# Patient Record
Sex: FEMALE | Race: WHITE | NOT HISPANIC OR LATINO | Employment: OTHER | URBAN - METROPOLITAN AREA
[De-identification: names, ages, dates, MRNs, and addresses within clinical notes are randomized per-mention and may not be internally consistent; named-entity substitution may affect disease eponyms.]

---

## 2017-01-16 ENCOUNTER — OFFICE VISIT (OUTPATIENT)
Dept: MEDICAL GROUP | Facility: PHYSICIAN GROUP | Age: 52
End: 2017-01-16
Payer: COMMERCIAL

## 2017-01-16 VITALS
HEIGHT: 67 IN | SYSTOLIC BLOOD PRESSURE: 144 MMHG | WEIGHT: 206 LBS | BODY MASS INDEX: 32.33 KG/M2 | RESPIRATION RATE: 16 BRPM | HEART RATE: 84 BPM | OXYGEN SATURATION: 97 % | TEMPERATURE: 98.1 F | DIASTOLIC BLOOD PRESSURE: 90 MMHG

## 2017-01-16 DIAGNOSIS — R92.8 ABNORMAL MAMMOGRAM OF BOTH BREASTS: ICD-10-CM

## 2017-01-16 DIAGNOSIS — E66.9 OBESITY (BMI 30.0-34.9): ICD-10-CM

## 2017-01-16 PROCEDURE — 99214 OFFICE O/P EST MOD 30 MIN: CPT | Performed by: NURSE PRACTITIONER

## 2017-01-16 RX ORDER — PHENTERMINE HYDROCHLORIDE 37.5 MG/1
37.5 CAPSULE ORAL EVERY MORNING
Qty: 30 CAP | Refills: 0 | Status: SHIPPED | OUTPATIENT
Start: 2017-01-16 | End: 2018-05-25 | Stop reason: SDUPTHER

## 2017-01-16 NOTE — PROGRESS NOTES
"Chief Complaint   Patient presents with   • Results     Follow up       Subjective:   Rosita Chu is a 51 y.o. female here today for evaluation and management of:    Abnormal mammogram of both breasts  Patient had asymmetrical breast findings on mammo with followup ultrasound.  No calcifications or masses found.  Recommendation is for 6 month followup.  Order placed.     Obesity (BMI 30.0-34.9)  Patient stopped taking Phentermine during the holiday.  She has now started her walking program and is starting to take phentermine.  Denies palpitations, chest pain.  Weight is down 3 pounds since last visit.         Current medicines (including changes today)  Current Outpatient Prescriptions   Medication Sig Dispense Refill   • phentermine 37.5 MG capsule Take 1 Cap by mouth every morning. 30 Cap 0   • phentermine 37.5 MG capsule Take 1 Cap by mouth every morning. 30 Cap 0   • clobetasol (TEMOVATE) 0.05 % OINT APPLY TO AFFECTED AREA(S) 2 TIMES A DAY. AS DIRECTED 30 g 2     No current facility-administered medications for this visit.     She  has a past medical history of Anxiety; Depression; ASTHMA; and Allergic rhinitis (10/6/2014).    ROS + obesity. - chest pain, palpitations.  No chest pain, no shortness of breath, no abdominal pain       Objective:     Blood pressure 144/90, pulse 84, temperature 36.7 °C (98.1 °F), resp. rate 16, height 1.702 m (5' 7.01\"), weight 93.441 kg (206 lb), SpO2 97 %. Body mass index is 32.26 kg/(m^2). down 3 pounds  Physical Exam:  Constitutional: Alert, no distress.  Skin: Warm, dry, good turgor,no cyanosis, no rashes in visible areas.  Eye: Equal, round and reactive, conjunctiva clear, lids normal.  Ears: No tenderness, no discharge.  External canals are without any significant edema or erythema. .  Gross auditory acuity is intact.  Nose: symmetrical without tenderness, no discharge.  Mouth/Throat: lips without lesion.  Oropharynx clear.    Neck: Trachea midline, no masses, no " obvious thyroid enlargement.. . Range of motion within normal limits.  Neuro: Cranial nerves 2-12 grossly intact.  No sensory deficit.  Respiratory: Unlabored respiratory effort, lungs clear to auscultation, no wheezes, no ronchi.  Cardiovascular: Normal S1, S2, no murmur, no edema.  Abdomen: Obese, non-tender, no masses, no guarding,  no hepatosplenomegaly.  Psych: Alert and oriented x3, normal affect and mood and judgement.        Assessment and Plan:   The following treatment plan was discussed    1. Abnormal mammogram of both breasts  Chronic. Stable.  Will repeat diagnostic mammo/ultrasound in 6 months per radiology recommendation.  Order placed.  Will monitor and follow    2. Obesity (BMI 30.0-34.9)  Chronic. Improving.  Continue with phentermine and walking program.  Reviewed labs.  Diet and exercise around lipid panel.  Will monitor and follow up in 3 months. Patient is due for well woman exam.  She will schedule pap in March, 2017.  - phentermine 37.5 MG capsule; Take 1 Cap by mouth every morning.  Dispense: 30 Cap; Refill: 0      Followup: Return in about 3 months (around 4/16/2017) for well women.

## 2017-01-16 NOTE — ASSESSMENT & PLAN NOTE
Patient stopped taking Phentermine during the holiday.  She has now started her walking program and is starting to take phentermine.  Denies palpitations, chest pain.  Weight is down 3 pounds since last visit.

## 2017-01-16 NOTE — ASSESSMENT & PLAN NOTE
Patient had asymmetrical breast findings on mammo with followup ultrasound.  No calcifications or masses found.  Recommendation is for 6 month followup.  Order placed.

## 2017-05-18 ENCOUNTER — OFFICE VISIT (OUTPATIENT)
Dept: MEDICAL GROUP | Facility: PHYSICIAN GROUP | Age: 52
End: 2017-05-18
Payer: COMMERCIAL

## 2017-05-18 VITALS
WEIGHT: 206 LBS | TEMPERATURE: 97.3 F | HEART RATE: 92 BPM | HEIGHT: 67 IN | RESPIRATION RATE: 14 BRPM | SYSTOLIC BLOOD PRESSURE: 142 MMHG | OXYGEN SATURATION: 94 % | DIASTOLIC BLOOD PRESSURE: 90 MMHG | BODY MASS INDEX: 32.33 KG/M2

## 2017-05-18 DIAGNOSIS — J34.89 FRONTAL SINUS PAIN: ICD-10-CM

## 2017-05-18 PROCEDURE — 99214 OFFICE O/P EST MOD 30 MIN: CPT | Performed by: NURSE PRACTITIONER

## 2017-05-18 RX ORDER — AZITHROMYCIN 250 MG/1
TABLET, FILM COATED ORAL
Qty: 6 TAB | Refills: 0 | Status: SHIPPED | OUTPATIENT
Start: 2017-05-18 | End: 2019-12-03

## 2017-05-18 NOTE — ASSESSMENT & PLAN NOTE
Patient states that she has been sick for the past 2 weeks. She has had fever chills nausea sinus pain congestion and teeth pain for this period of time. She states that she has taken Sudafed and some Tylenol with minimal relief.

## 2017-05-18 NOTE — PROGRESS NOTES
"Chief Complaint   Patient presents with   • Sinus Problem       Subjective:   Rosita Chu is a 52 y.o. female here today for evaluation and management of:    Frontal sinus pain  Patient states that she has been sick for the past 2 weeks. She has had fever chills nausea sinus pain congestion and teeth pain for this period of time. She states that she has taken Sudafed and some Tylenol with minimal relief.     Patient states on her chart that she is allergic to erythromycin as it causes hives but also states that she has had a Z-Nico in the past 3 or 4 years without issue. She is also allergic to penicillin including Augmentin.    Current medicines (including changes today)  Current Outpatient Prescriptions   Medication Sig Dispense Refill   • azithromycin (ZITHROMAX) 250 MG Tab Take two tablets today and then one table for the next four days 6 Tab 0   • phentermine 37.5 MG capsule Take 1 Cap by mouth every morning. 30 Cap 0   • phentermine 37.5 MG capsule Take 1 Cap by mouth every morning. 30 Cap 0   • clobetasol (TEMOVATE) 0.05 % OINT APPLY TO AFFECTED AREA(S) 2 TIMES A DAY. AS DIRECTED 30 g 2     No current facility-administered medications for this visit.     She  has a past medical history of Anxiety; Depression; ASTHMA; and Allergic rhinitis (10/6/2014).    ROS as stated in history of present illness.  No chest pain, no shortness of breath, no abdominal pain       Objective:     Blood pressure 142/90, pulse 92, temperature 36.3 °C (97.3 °F), resp. rate 14, height 1.702 m (5' 7\"), weight 93.441 kg (206 lb), SpO2 94 %. Body mass index is 32.26 kg/(m^2).   Physical Exam:  Constitutional: Alert, no distress.  Skin: Warm, dry, good turgor,no cyanosis, no rashes in visible areas.  Eye: Equal, round and reactive, conjunctiva clear, lids normal.  Ears: No tenderness, no discharge.  External canals are without any significant edema or erythema.  Tympanic membranes are without any inflammation, no effusion.  Gross " auditory acuity is intact.  Nose: symmetrical without tenderness, yellow discharge with red nasal passages. Patient has facial pain and frontal maxillary sinuses along with upper and lower teeth pain.   Mouth/Throat: lips without lesion.  Oropharynx clear.  Throat with mild erythema, exudates . Tonsils are absent.  Neck: Trachea midline, no masses, no obvious thyroid enlargement.. No cervical or supraclavicular lymphadenopathy. Range of motion within normal limits.  Neuro: Cranial nerves 2-12 grossly intact.  No sensory deficit.  Respiratory: Unlabored respiratory effort, lungs clear to auscultation, no wheezes, no ronchi.  Cardiovascular: Normal S1, S2, no murmur, no edema.  Abdomen: Soft, non-tender, no masses, no guarding,  no hepatosplenomegaly.  Psych: Alert and oriented x3, normal affect and mood and judgement.        Assessment and Plan:   The following treatment plan was discussed    1. Frontal sinus pain  This is a new problem to me. Acute. Unstable. Z-Nico ordered. Discussed with patient what to do if she were to develop hives. She does have Benadryl at home. And we discussed urgent care or ER precautions. Continue with Tylenol, she may add Advil in between for pain and fever control, cold and cough medication increase fluids and rest. Patient to let me know how she is doing on Monday.  PLEASE NOTE: This dictation was created using voice recognition software. I have made every reasonable attempt to correct obvious errors, but I expect that there are errors of grammar and possibly content that I did not discover before finalizing the note.      Followup: Return if symptoms worsen or fail to improve.

## 2017-05-18 NOTE — MR AVS SNAPSHOT
"        Rosita Chu   2017 9:40 AM   Office Visit   MRN: [id number]    Department:  [facility]   Dept Phone:  [phone]    Description:  Female : 1965   Provider:  SRAVAN Beach           Reason for Visit     Sinus Problem           Allergies as of 2017     Allergen Noted Reactions    Ees [Erythromycin] 2009   Hives    Pcn [Penicillins] 2009         You were diagnosed with     Frontal sinus pain   [[id number]]         Vital Signs     Blood Pressure Pulse Temperature Respirations Height Weight    142/90 mmHg 92 36.3 °C (97.3 °F) 14 1.702 m (5' 7\") 93.441 kg (206 lb)    Body Mass Index Oxygen Saturation Smoking Status             32.26 kg/m2 94% Never Smoker          Basic Information     Date Of Birth Sex Race Ethnicity Preferred Language    1965 Female White Non- English      Your appointments     2017  9:00 AM   MA DX30 with [facility] MG 1   [facility] BREAST [facility] ([address])    [address] [place] [id number]   [phone]              Problem List              ICD-10-CM Priority Class Noted - Resolved    Anxiety F41.9   Unknown - Present    Depression F32.9   Unknown - Present    ASTHMA    Unknown - Present    Lichen sclerosus et atrophicus of the vulva N90.4   2012 - Present    Allergic rhinitis J30.9   10/6/2014 - Present    Obesity (BMI 30.0-34.9) E66.9   2016 - Present    Abnormal mammogram of both breasts R92.8   2016 - Present    Frontal sinus pain J34.89   2017 - Present      Health Maintenance        Date Due Completion Dates    IMM PNEUMOCOCCAL 19-64 (ADULT) MEDIUM RISK SERIES (1 of 1 - PPSV23) 3/22/1984 ---    PAP SMEAR 2015, 2010    COLONOSCOPY 3/22/2015 ---    MAMMOGRAM 2017, 2013 (Done)    Override on 2013: Done    IMM DTaP/Tdap/Td Vaccine (2 - Td) 10/6/2024 10/6/2014            Current Immunizations     Influenza Vaccine Pediatric " 10/17/2005    Influenza Vaccine Quad Inj (Pf) 10/6/2014    Tdap Vaccine 10/6/2014      Below and/or attached are the medications your provider expects you to take. Review all of your home medications and newly ordered medications with your provider and/or pharmacist. Follow medication instructions as directed by your provider and/or pharmacist. Please keep your medication list with you and share with your provider. Update the information when medications are discontinued, doses are changed, or new medications (including over-the-counter products) are added; and carry medication information at all times in the event of emergency situations     Allergies:  EES - Hives     PCN - (reactions not documented)               Medications  Valid as of: May 18, 2017 -  9:51 AM    Generic Name Brand Name Tablet Size Instructions for use    Azithromycin (Tab) ZITHROMAX 250 MG Take two tablets today and then one table for the next four days        Clobetasol Propionate (Ointment) TEMOVATE 0.05 % APPLY TO AFFECTED AREA(S) 2 TIMES A DAY. AS DIRECTED        Phentermine HCl (Cap) phentermine 37.5 MG Take 1 Cap by mouth every morning.        Phentermine HCl (Cap) phentermine 37.5 MG Take 1 Cap by mouth every morning.        .                 Medicines prescribed today were sent to:     [facility] [address] [place] [address] AT [facility] & [address] [address]    Phone: [phone] Fax: [phone]    Open 24 Hours?: No      Medication refill instructions:       If your prescription bottle indicates you have medication refills left, it is not necessary to call your provider’s office. Please contact your pharmacy and they will refill your medication.    If your prescription bottle indicates you do not have any refills left, you may request refills at any time through one of the following ways: The online BugBuster system (except Urgent Care), by calling your provider’s office, or by  asking your pharmacy to contact your provider’s office with a refill request. Medication refills are processed only during regular business hours and may not be available until the next business day. Your provider may request additional information or to have a follow-up visit with you prior to refilling your medication.   *Please Note: Medication refills are assigned a new Rx number when refilled electronically. Your pharmacy may indicate that no refills were authorized even though a new prescription for the same medication is available at the pharmacy. Please request the medicine by name with the pharmacy before contacting your provider for a refill.           Moz Access Code: [email or web address]  Expires: 6/17/2017  9:51 AM    Moz  A secure, online tool to manage your health information     [employer]’s Moz® is a secure, online tool that connects you to your personalized health information from the privacy of your home -- day or night - making it very easy for you to manage your healthcare. Once the activation process is completed, you can even access your medical information using the Moz diamante, which is available for free in the Apple Diamante store or Google Play store.     Moz provides the following levels of access (as shown below):   My Chart Features   [facility] Doctor [facility]  Specialists [facility]  Urgent  Care Non-[facility]  Primary Care  Doctor   Email your healthcare team securely and privately 24/7 X X X    Manage appointments: schedule your next appointment; view details of past/upcoming appointments X      Request prescription refills. X      View recent personal medical records, including lab and immunizations X X X X   View health record, including health history, allergies, medications X X X X   Read reports about your outpatient visits, procedures, consult and ER notes X X X X   See your discharge summary, which is a recap of your hospital and/or ER visit that includes your  diagnosis, lab results, and care plan. X X       How to register for Netcontinuum:  1. Go to  [email or web address].  2. Click on the Sign Up Now box, which takes you to the New Member Sign Up page. You will need to provide the following information:  a. Enter your Netcontinuum Access Code exactly as it appears at the top of this page. (You will not need to use this code after you’ve completed the sign-up process. If you do not sign up before the expiration date, you must request a new code.)   b. Enter your date of birth.   c. Enter your home email address.   d. Click Submit, and follow the next screen’s instructions.  3. Create a Back9 Networkt ID. This will be your Netcontinuum login ID and cannot be changed, so think of one that is secure and easy to remember.  4. Create a Back9 Networkt password. You can change your password at any time.  5. Enter your Password Reset Question and Answer. This can be used at a later time if you forget your password.   6. Enter your e-mail address. This allows you to receive e-mail notifications when new information is available in Netcontinuum.  7. Click Sign Up. You can now view your health information.    For assistance activating your Netcontinuum account, call [phone]

## 2017-05-22 ENCOUNTER — TELEPHONE (OUTPATIENT)
Dept: MEDICAL GROUP | Facility: PHYSICIAN GROUP | Age: 52
End: 2017-05-22

## 2017-05-22 NOTE — TELEPHONE ENCOUNTER
VOICEMAIL  1. Caller Name: Rosita                      Call Back Number: [phone] (home)       2. Message: Patient took her last Z-pack pill this morning and feels like she needs something stronger. She is a little bit better. Please advise     3. Patient approves office to leave a detailed voicemail/MyChart message: N\A

## 2017-05-23 NOTE — TELEPHONE ENCOUNTER
Please let patient know that the Z pack is only 5 days, but it last for 10 days.  We would want to wait and see if there is continued improvement.  If no improvement at the 10 day chuck, patient should be seen again.  Sometimes it takes a little time.  Let's see how you are feeling by Thursday.  PATRICK Beach.

## 2017-06-19 ENCOUNTER — TELEPHONE (OUTPATIENT)
Dept: MEDICAL GROUP | Facility: PHYSICIAN GROUP | Age: 52
End: 2017-06-19

## 2017-06-19 ENCOUNTER — HOSPITAL ENCOUNTER (OUTPATIENT)
Dept: RADIOLOGY | Facility: MEDICAL CENTER | Age: 52
End: 2017-06-19
Attending: NURSE PRACTITIONER
Payer: COMMERCIAL

## 2017-06-19 DIAGNOSIS — R92.8 ABNORMAL MAMMOGRAM OF BOTH BREASTS: ICD-10-CM

## 2017-06-19 PROCEDURE — G0204 DX MAMMO INCL CAD BI: HCPCS

## 2017-06-19 NOTE — TELEPHONE ENCOUNTER
----- Message from SRAVAN Beach sent at 6/19/2017 12:48 PM PDT -----  Please notify patient that her mammogram results have been read.  There was no evidence of malignancy or suspicious areas of concern.   There have been no changes from the previous mammogram.   Recommend yearly follow up screening.  SRAVAN Beach

## 2017-11-22 ENCOUNTER — HOSPITAL ENCOUNTER (OUTPATIENT)
Facility: MEDICAL CENTER | Age: 52
End: 2017-11-22
Attending: NURSE PRACTITIONER
Payer: COMMERCIAL

## 2017-11-22 ENCOUNTER — OFFICE VISIT (OUTPATIENT)
Dept: MEDICAL GROUP | Facility: PHYSICIAN GROUP | Age: 52
End: 2017-11-22
Payer: COMMERCIAL

## 2017-11-22 VITALS
WEIGHT: 222 LBS | BODY MASS INDEX: 34.84 KG/M2 | RESPIRATION RATE: 14 BRPM | HEART RATE: 77 BPM | TEMPERATURE: 97.9 F | HEIGHT: 67 IN | OXYGEN SATURATION: 95 % | DIASTOLIC BLOOD PRESSURE: 80 MMHG | SYSTOLIC BLOOD PRESSURE: 138 MMHG

## 2017-11-22 DIAGNOSIS — Z23 NEED FOR VACCINATION: ICD-10-CM

## 2017-11-22 DIAGNOSIS — L70.0 ACNE VULGARIS: ICD-10-CM

## 2017-11-22 DIAGNOSIS — Z01.419 WELL WOMAN EXAM WITH ROUTINE GYNECOLOGICAL EXAM: ICD-10-CM

## 2017-11-22 DIAGNOSIS — Z12.4 SCREENING FOR CERVICAL CANCER: ICD-10-CM

## 2017-11-22 DIAGNOSIS — Z12.11 SCREEN FOR COLON CANCER: ICD-10-CM

## 2017-11-22 DIAGNOSIS — J34.89 FRONTAL SINUS PAIN: ICD-10-CM

## 2017-11-22 PROBLEM — L70.9 ACNE: Status: ACTIVE | Noted: 2017-11-22

## 2017-11-22 PROCEDURE — 90686 IIV4 VACC NO PRSV 0.5 ML IM: CPT | Performed by: NURSE PRACTITIONER

## 2017-11-22 PROCEDURE — 90471 IMMUNIZATION ADMIN: CPT | Performed by: NURSE PRACTITIONER

## 2017-11-22 PROCEDURE — 88175 CYTOPATH C/V AUTO FLUID REDO: CPT

## 2017-11-22 PROCEDURE — 87624 HPV HI-RISK TYP POOLED RSLT: CPT

## 2017-11-22 PROCEDURE — 99396 PREV VISIT EST AGE 40-64: CPT | Mod: 25 | Performed by: NURSE PRACTITIONER

## 2017-11-22 RX ORDER — DOXYCYCLINE HYCLATE 100 MG
100 TABLET ORAL 2 TIMES DAILY
Qty: 20 TAB | Refills: 0 | Status: SHIPPED | OUTPATIENT
Start: 2017-11-22 | End: 2019-12-03

## 2017-11-22 ASSESSMENT — PATIENT HEALTH QUESTIONNAIRE - PHQ9: CLINICAL INTERPRETATION OF PHQ2 SCORE: 0

## 2017-11-22 NOTE — ASSESSMENT & PLAN NOTE
Recent outbreak of acne along chin. Stressful events with son, would like an antibiotic.  This has helped in the past.  Using OTC Benzyl peroxide to help dry things out.

## 2017-11-22 NOTE — PROGRESS NOTES
CC:  Pap/Well Woman Exam    History of present illness:  Rosita Chu is 52 y.o.white female presenting today for well woman exam with gynecological exam and Pap smear.   She reports her periods are absent, menopausal.   Exercise not currently exercising    Frontal sinus pain  Started to have sinus pressure, pain and fever 7-10 days ago.  Coughing and blowing up green mucus.     Well woman exam with routine gynecological exam  Here for pap.  Has history of lichen sclerosus of vulva.  No current flare up.  No discharge reported.  Breast exam without abnormal findings.     Acne  Recent outbreak of acne along chin. Stressful events with son, would like an antibiotic.  This has helped in the past.  Using OTC Benzyl peroxide to help dry things out.      Past Medical History:   Diagnosis Date   • Allergic rhinitis 10/6/2014   • Anxiety    • ASTHMA    • Depression        Past Surgical History:   Procedure Laterality Date   • LUMBAR LAMINECTOMY DISKECTOMY  2005    L4-5   • HYSTEROSCOPY THERMAL ABLATION     • TONSILLECTOMY      17 years old       Outpatient Encounter Prescriptions as of 11/22/2017   Medication Sig Dispense Refill   • doxycycline (VIBRAMYCIN) 100 MG Tab Take 1 Tab by mouth 2 times a day. 20 Tab 0   • azithromycin (ZITHROMAX) 250 MG Tab Take two tablets today and then one table for the next four days 6 Tab 0   • phentermine 37.5 MG capsule Take 1 Cap by mouth every morning. 30 Cap 0   • phentermine 37.5 MG capsule Take 1 Cap by mouth every morning. 30 Cap 0   • clobetasol (TEMOVATE) 0.05 % OINT APPLY TO AFFECTED AREA(S) 2 TIMES A DAY. AS DIRECTED 30 g 2     No facility-administered encounter medications on file as of 11/22/2017.        Patient Active Problem List    Diagnosis Date Noted   • Acne 11/22/2017   • Well woman exam with routine gynecological exam 11/22/2017   • Frontal sinus pain 05/18/2017   • Obesity (BMI 30.0-34.9) 11/18/2016   • Abnormal mammogram of both breasts 11/18/2016   • Allergic  "rhinitis 10/06/2014   • Lichen sclerosus et atrophicus of the vulva 01/22/2012   • Anxiety    • Depression    • ASTHMA        .  Social History     Social History   • Marital status:      Spouse name: N/A   • Number of children: N/A   • Years of education: N/A     Occupational History   • Not on file.     Social History Main Topics   • Smoking status: Never Smoker   • Smokeless tobacco: Never Used   • Alcohol use 1.2 oz/week     2 Glasses of wine per week      Comment: month   • Drug use: No   • Sexual activity: Not Currently     Partners: Male      Comment: , 3 children, Manicurist     Other Topics Concern   • Not on file     Social History Narrative   • No narrative on file       Family History   Problem Relation Age of Onset   • Hypertension Mother    • Stroke Father    • Alcohol/Drug Sister    • Alcohol/Drug Sister    • Alcohol/Drug Brother          ROS:  Denies Weight loss, fatigue, chest pain, SOB, bowel or bladder changes. No significant dysmenorrhea, concerning vaginal discharge or irritation, no dyspareunia or postcoital bleeding. Denies h/o migraine with aura. Denies musculoskeletal, neurological, or psychiatric problems.      /80   Pulse 77   Temp 36.6 °C (97.9 °F)   Resp 14   Ht 1.702 m (5' 7\")   Wt 100.7 kg (222 lb)   SpO2 95%   BMI 34.77 kg/m²     GEN:  Appears well and in no apparent distress   NECK:  Supple without adenopathy or thyromegaly  LUNGS:  Clear and equal. No wheeze, ronchi, or rales.  CV:  RRR, S1, S2. No murmur.  Pedal pulses 2+ bilaterally.  BREAST:  Symmetrical without masses. No nipple discharge.  ABD:  Soft, non-tender, non-distended, normal bowel sounds.  No hepatosplenomegaly.  :  Normal external female genitalia. No lesions identified  Vaginal canal clear.  Cervix appears normal. Specimen collected from transformation zone. Bimanual exam:  No CMT, normal size uterus without masses or tenderness; no adnexal masses or tenderness.      Assessment and " plan    1. Frontal sinus pain  This is a new problem to me.  Sinusitis viral vs bacterial.  We will try doxyclcyine 100 mg bid X10 Increase fluids, increase rest, DayQuil or NyQuil for congestion. Ibuprofen for discomfort. Patient to return to clinic in 5-7 days if not feeling improvement.    2. Need for vaccination  Patient requesting flu vaccine. No fever at this time. Consent obtained.  I have placed the below orders and discussed them with an approved delegating provider.  The MA is performing the below orders under the direction of Derrick Edwards MD.    - INFLUENZA VACCINE QUAD INJ >3Y(PF)    3. Screening for cervical cancer  Specimen obtained. Will monitor results.  - THINPREP PAP WITH HPV; Future    4. Acne vulgaris  This is a new problem to me. Acute. Ongoing issue with flare. Discussed that the doxycycline for her sinuses will help clear her face. Continue with good facial care including benzyl peroxide. Discussed stress management. Encouraged focusing on plaque-based diet.    5. Screen for colon cancer  Patient due for colon cancer screening. Referral placed to gastroenterology.  - REFERRAL TO GASTROENTEROLOGY    6. Well woman exam with routine gynecological exam  Patient here for well woman exam. No abnormal findings. We will await Pap results. Monitor and follow.      F/u pending results

## 2017-11-22 NOTE — ASSESSMENT & PLAN NOTE
Started to have sinus pressure, pain and fever 7-10 days ago.  Coughing and blowing up green mucus.

## 2017-11-22 NOTE — ASSESSMENT & PLAN NOTE
Here for pap.  Has history of lichen sclerosus of vulva.  No current flare up.  No discharge reported.  Breast exam without abnormal findings.

## 2017-11-28 LAB
CYTOLOGY REG CYTOL: NORMAL
HPV HR 12 DNA CVX QL NAA+PROBE: NEGATIVE
HPV16 DNA SPEC QL NAA+PROBE: NEGATIVE
HPV18 DNA SPEC QL NAA+PROBE: NEGATIVE
SPECIMEN SOURCE: NORMAL

## 2017-11-29 ENCOUNTER — TELEPHONE (OUTPATIENT)
Dept: MEDICAL GROUP | Facility: PHYSICIAN GROUP | Age: 52
End: 2017-11-29

## 2017-11-29 NOTE — TELEPHONE ENCOUNTER
----- Message from SRAVAN Beach sent at 11/29/2017  7:20 AM PST -----  Please notify patient that her PAP results are back and there is no evidence of infection or malignancy.  Recommend repeat testing in 2 years.  SRAVAN Beach

## 2017-11-29 NOTE — LETTER
"November 29, 2017         Melida Chu  [address] [facility] [address]        Dear Melida:      Below are the results from your recent visit:    Please notify patient that her PAP results are back and there is no evidence of infection or malignancy.  Recommend repeat testing in 2 years.   SRAVAN Beach     Resulted Orders   THINPREP PAP WITH HPV   Result Value Ref Range    Cytology Reg See Path Report       Comment:      A separate pathology report will follow after the Cytology  specimen has been received by the laboratory and the results  are signed out by a pathologist.  Please see \"Pathology GYN Specimen\" order for these results.      Source Cervical     HPV Genotype 16 Negative Negative    HPV Genotype 18 Negative Negative    HPV Other High Risk Genotypes Negative Negative      Comment:      This test detects the high-risk HPV types 16, 18, 31, 33,  35, 39, 45, 51, 52, 56, 58, 59, 66, and 68. It is intended  for use in women 21 years and older with ASC-US cervical  cytology results and in women 30 years and older with  positive high-risk HPV results. Sensitivity may be affected  by specimen collection methods, stage of infection, and the  presence of interfering substances. Results should be  interpreted in conjunction with other available laboratory  and clinical data.      Narrative    Clinical Information:->01 Routine Check-up   PATHOLOGY GYN SPECIMEN    Narrative    [facility]  DEPARTMENT OF PATHOLOGY  [address] [place] [address]  [address]  Phone [phone]          Fax [phone]  Horace Meza M.D.     Marleen Skaggs M.D.     Kim Mosquera M.D.  Marleen Short M.D.     Sean Oscar M.D.     Ren Draper M.D.  Patient:    MELIDA CHU        Specimen    [id number]  #:  Copies to:  CERVICOVAGINAL CYTOLOGY REPORT  INTERPRETATION:  NEGATIVE FOR INTRAEPITHELIAL LESION OR MALIGNANCY.  SPECIMEN ADEQUACY:  Satisfactory for Evaluation.  No " endocervical cells/transformation zone  component present.  Report electronically signed by:  HARIS DISLA  ------------------------------------------------------------------------  This liquid based ThinPrep Pap test is screened with the use of an  image guided system.  Cervicovaginal Papanicolaou (Pap) smears are  screening tests with a well-documented false-negative rate.  A single  negative test is significantly less accurate than multiple negative  tests in successive screening periods.  RELATED LABORATORY RESULTS  Ordered by: NICOLE Ord Date: 11/22/2017 Ord Time: 21:38  Test Name        Collected  Result          Abnor  Range     Units  Date                      mal  Specimen Source  11/22/201  Cervical  7  HPV Genotype 16  11/22/201  Negative               Negative  7  HPV Genotype 18  11/22/201  Negative               Negative  7  HPV Other High   11/22/201  Negative               Negative  Risk Genotypes   7  Report electronically signed by:  HARIS DISLA  Diagnosis performed at: [facility]  Pathology  Department, [address] [place] [address]  [id number]  Printed 00/00/0000 [id number] MELIDA DUNCAN  Page 1 of 1 MRN#:[id number]     If you have any questions or concerns, please don't hesitate to call.    Electronically Signed

## 2018-04-16 DIAGNOSIS — E66.9 OBESITY (BMI 30.0-34.9): ICD-10-CM

## 2018-04-16 RX ORDER — PHENTERMINE HYDROCHLORIDE 37.5 MG/1
37.5 CAPSULE ORAL EVERY MORNING
Qty: 30 CAP | Refills: 0 | Status: SHIPPED
Start: 2018-04-16 | End: 2018-05-16

## 2018-04-16 NOTE — TELEPHONE ENCOUNTER
Requested Prescriptions     Signed Prescriptions Disp Refills   • phentermine 37.5 MG capsule 30 Cap 0     Sig: Take 1 Cap by mouth every morning for 30 days.     Authorizing Provider: SAMRA REHMAN     Needs appointment for further refills  PATRICK Beach.

## 2018-04-16 NOTE — TELEPHONE ENCOUNTER
RX FAXED TO     [id number] - [place], [place] - [address] AT [facility] [address]  [address] [place] [address]  Phone: [phone] Fax: [phone]

## 2018-05-25 DIAGNOSIS — E66.9 OBESITY (BMI 30.0-34.9): ICD-10-CM

## 2018-05-25 RX ORDER — PHENTERMINE HYDROCHLORIDE 37.5 MG/1
37.5 CAPSULE ORAL EVERY MORNING
Qty: 30 CAP | Refills: 0 | Status: SHIPPED
Start: 2018-05-25 | End: 2018-06-27 | Stop reason: SDUPTHER

## 2018-05-25 NOTE — TELEPHONE ENCOUNTER
Requested Prescriptions     Signed Prescriptions Disp Refills   • phentermine 37.5 MG capsule 30 Cap 0     Sig: Take 1 Cap by mouth every morning for 30 days.     Authorizing Provider: SAMRA REHMAN A.P.R.N.

## 2018-05-25 NOTE — TELEPHONE ENCOUNTER
Was the patient seen in the last year in this department? Yes     Does patient have an active prescription for medications requested? No     Received Request Via: Patient       MELIDA DUNCAN     Age: 53  demographics   Data as of: 5/25/2018              NARCOTIC 000        SEDATIVE 000       STIMULANT 130       NARxSCORES can range from 000 to 999. The first two digits represent the composite percentile risk based on an overall analysis of prescription drug use. The third digit represents the number of active prescriptions. The distribution of scores in the population is such that approximately 75% fall below 200, 95% fall below 500 and 99% fall below 650. The information on this report is not warranted as accurate or complete. This report is based on the search criteria supplied and the data entered by the dispensing pharmacy. For more information about any prescription, please contact the dispensing pharmacy or the prescriber. NARxSCORES and Reports are intended to aid, not replace medical decision making. None of the information presented should be used as sole justification for providing or refusing to provide medications.       Rx Graph   [x] Narcotic [x] Sedative [x] Stimulant [x] Buprenorphine [x] Other    Created with Raphaël [id number] - Latia Ivory  *Per CDC guidance, the MME conversion factors prescribed or provided as part of the medication-assisted treatment for opioid use disorder should not be used to benchmark against dosage thresholds meant for opioids prescribed for pain. Buprenorphine products have no agreed upon morphine equivalency, and as partial opioid agonists, are not expected to be associated with overdose risk in the same dose-dependent manner as doses for full agonist opioids. MME = morphine milligram equivalents. LME = Lorazepam milligram equivalents. mg = dose in milligrams.     Data Analysis        Stimulants (130)  2 months  6 months  1 year  2  years    Prescribers (stimulant) 1  19  1  12  1  8  1  6    Pharmacies (stimulant) 1  25  1  16  1  13  1  10    Stimulant days 30  34  30  16  30  14  90  33    Stimulant overlap (1) 0  0  0  0  0  0  0  0       (1) Number of days for which a simliar type of medication was prescribed from different prescribers for use on the same day.         Summary   Total Prescriptions: 3   Total Prescribers: 1   Total Pharmacies: 1   Narcotics* (excluding buprenorphine):   Current Qty: 0   Current MME/day: 0.00   30 Day Avg MME/day: 0.00   Buprenorphine*   Current Qty: 0   Current mg/day: 0.00   30 Day Avg mg/day: 0.00   Sedatives*   Current Qty: 0   Current LME/day: 0.00   30 Day Avg LME/day: 0.00     Prescriptions Total Prescriptions: 3 Private Pay: 3   Fill Date PT Written Drug Qty Days Rx # Prescriber Pharmacy Refill Daily Dose * Pymt Type    04/18/2018 1  04/18/2018 PHENTERMINE 37.5 MG CAPSULE 30 30 [id number] [facility] 0  Private Pay [place]   01/27/2017 1  01/16/2017 PHENTERMINE 37.5 MG CAPSULE 30 30 [id number] [facility] 0  Private Pay [place]   11/28/2016 1  11/18/2016 PHENTERMINE 37.5 MG CAPSULE 30 30 [id number] [facility] 0  Private Pay [place]     Providers Total Providers: 1   Name Address [facility] SAMRA CAAL [address] [facility] [place] [address] [id number]     Pharmacies Total Pharmacies: 1   Name Address [facility] [place] [address] [facility] [address] [id number]

## 2018-05-26 NOTE — TELEPHONE ENCOUNTER
RX FAXED TO     [id number] - [place], [place] - [address] [address]  Phone: [phone] Fax: [phone]

## 2018-06-27 DIAGNOSIS — E66.9 OBESITY (BMI 30.0-34.9): ICD-10-CM

## 2018-06-27 RX ORDER — PHENTERMINE HYDROCHLORIDE 37.5 MG/1
37.5 CAPSULE ORAL EVERY MORNING
Qty: 30 CAP | Refills: 0 | Status: SHIPPED
Start: 2018-06-27 | End: 2018-07-27

## 2018-06-28 NOTE — TELEPHONE ENCOUNTER
rx faxed to     [id number] - [place], [place] - [address] [address]  Phone: [phone] Fax: [phone]

## 2019-01-01 ENCOUNTER — OFFICE VISIT (OUTPATIENT)
Dept: URGENT CARE | Facility: PHYSICIAN GROUP | Age: 54
End: 2019-01-01
Payer: COMMERCIAL

## 2019-01-01 VITALS
BODY MASS INDEX: 33.27 KG/M2 | OXYGEN SATURATION: 94 % | WEIGHT: 212 LBS | TEMPERATURE: 97.9 F | RESPIRATION RATE: 18 BRPM | DIASTOLIC BLOOD PRESSURE: 82 MMHG | SYSTOLIC BLOOD PRESSURE: 130 MMHG | HEIGHT: 67 IN | HEART RATE: 89 BPM

## 2019-01-01 DIAGNOSIS — J01.00 ACUTE MAXILLARY SINUSITIS, RECURRENCE NOT SPECIFIED: ICD-10-CM

## 2019-01-01 PROCEDURE — 99214 OFFICE O/P EST MOD 30 MIN: CPT | Performed by: FAMILY MEDICINE

## 2019-01-01 RX ORDER — GUAIFENESIN 600 MG/1
600 TABLET, EXTENDED RELEASE ORAL EVERY 12 HOURS
COMMUNITY
End: 2019-12-03

## 2019-01-01 RX ORDER — BENZONATATE 200 MG/1
200 CAPSULE ORAL 3 TIMES DAILY PRN
Qty: 45 CAP | Refills: 0 | Status: SHIPPED | OUTPATIENT
Start: 2019-01-01 | End: 2019-12-03

## 2019-01-01 RX ORDER — DOXYCYCLINE HYCLATE 100 MG
100 TABLET ORAL 2 TIMES DAILY
Qty: 14 TAB | Refills: 0 | Status: SHIPPED | OUTPATIENT
Start: 2019-01-01 | End: 2019-01-08

## 2019-01-05 NOTE — PROGRESS NOTES
"  Chief Complaint   Patient presents with   • Sinus Pain     [id number]        Cough  This is a new problem. The current episode started 14 days ago. The problem has been gradually worsening. The problem occurs constantly. The cough is productive of yellow sputum. Associated symptoms include : headaches, fatigue, nasal congestion.   No objective fevers at home.    States cough is making it hard to sleep at night.   Pertinent negatives include no   nausea, vomiting, diarrhea, sweats, weight loss or wheezing. Nothing aggravates the symptoms.  Patient has tried several OTC cold products for the symptoms - no improvement. There is no history of asthma.        Past Medical History:   Diagnosis Date   • Allergic rhinitis 10/6/2014   • Anxiety    • ASTHMA    • Depression          Social History   Substance Use Topics   • Smoking status: Never Smoker   • Smokeless tobacco: Never Used   • Alcohol use 1.2 oz/week     2 Glasses of wine per week      Comment: month             Review of Systems   Constitutional: Negative for fever, chills and weight loss.   HENT - denies  ear pain.   Positive congestion, sore throat  Eyes: denies vision changes, discharge  Respiratory: Negative for hemoptysis and wheezing.    Cardiovascular: Negative for chest pain or PND.   Gastrointestinal:  No abdominal pain,  nausea, vomiting, diarrhea.  Negative for  blood in stool.    - no discharge, dysuria, frequency.      Neurological: Negative for dizziness.   + headaches.   musculoskeletal - denies myalgias, calf pain  Psych - denies anxiety/depression/mood changes.  Skin: no itching or rash  All other systems reviewed and are negative.             Objective:     Blood pressure 130/82, pulse 89, temperature 36.6 °C (97.9 °F), temperature source Temporal, resp. rate 18, height 1.702 m (5' 7\"), weight 96.2 kg (212 lb), SpO2 94 %.    Physical Exam   Constitutional: patient is oriented to person, place, and time. Patient appears well-developed and " well-nourished. No distress.   HENT:   Head: Normocephalic and atraumatic.   Right Ear: External ear normal.   Left Ear: External ear normal.   TMs both normal  Nose: Mucosal edema  present.   There is tenderness to percussion over left and right sinuses  Mouth/Throat: Mucous membranes are normal. No oral lesions.  No posterior pharyngeal erythema.  No oropharyngeal exudate or posterior oropharyngeal edema.   Eyes: Conjunctivae and EOM are normal. Pupils are equal, round, and reactive to light. Right eye exhibits no discharge. Left eye exhibits no discharge. No scleral icterus.   Neck: Normal range of motion. Neck supple. No tracheal deviation present.   Cardiovascular: Normal rate, regular rhythm and normal heart sounds.  Exam reveals no friction rub.    Pulmonary/Chest: Effort normal. No respiratory distress. Patient has no wheezes or rhonchi. Patient has no rales.    Musculoskeletal:  exhibits no edema.   Lymphadenopathy:     Patient has  cervical adenopathy.      Neurological: patient is alert and oriented to person, place, and time.   CN 2-12 intact  Skin: Skin is warm and dry. No rash noted. No erythema.   Psychiatric: patient  has a normal mood and affect.  behavior is normal.   Nursing note and vitals reviewed.              Assessment/Plan:           1. Acute maxillary sinusitis, recurrence not specified     - benzonatate (TESSALON) 200 MG capsule; Take 1 Cap by mouth 3 times a day as needed for Cough.  Dispense: 45 Cap; Refill: 0  - doxycycline (VIBRAMYCIN) 100 MG Tab; Take 1 Tab by mouth 2 times a day for 7 days.  Dispense: 14 Tab; Refill: 0        Follow up in one week if no improvement, sooner if symptoms worsen.

## 2019-12-03 ENCOUNTER — OFFICE VISIT (OUTPATIENT)
Dept: MEDICAL GROUP | Facility: PHYSICIAN GROUP | Age: 54
End: 2019-12-03
Payer: COMMERCIAL

## 2019-12-03 VITALS
WEIGHT: 223 LBS | SYSTOLIC BLOOD PRESSURE: 162 MMHG | HEART RATE: 73 BPM | DIASTOLIC BLOOD PRESSURE: 100 MMHG | BODY MASS INDEX: 35 KG/M2 | TEMPERATURE: 98 F | RESPIRATION RATE: 14 BRPM | HEIGHT: 67 IN | OXYGEN SATURATION: 96 %

## 2019-12-03 DIAGNOSIS — F32.89 OTHER DEPRESSION: ICD-10-CM

## 2019-12-03 DIAGNOSIS — Z00.00 PREVENTATIVE HEALTH CARE: ICD-10-CM

## 2019-12-03 DIAGNOSIS — R92.8 ABNORMAL MAMMOGRAM OF BOTH BREASTS: ICD-10-CM

## 2019-12-03 DIAGNOSIS — Z23 NEED FOR VACCINATION: ICD-10-CM

## 2019-12-03 DIAGNOSIS — N90.4 LICHEN SCLEROSUS ET ATROPHICUS OF THE VULVA: ICD-10-CM

## 2019-12-03 DIAGNOSIS — Z12.39 SCREENING FOR BREAST CANCER: ICD-10-CM

## 2019-12-03 PROCEDURE — 90471 IMMUNIZATION ADMIN: CPT | Performed by: NURSE PRACTITIONER

## 2019-12-03 PROCEDURE — 99396 PREV VISIT EST AGE 40-64: CPT | Mod: 25 | Performed by: NURSE PRACTITIONER

## 2019-12-03 PROCEDURE — 90686 IIV4 VACC NO PRSV 0.5 ML IM: CPT | Performed by: NURSE PRACTITIONER

## 2019-12-03 RX ORDER — CLOBETASOL PROPIONATE 0.5 MG/G
1 OINTMENT TOPICAL 2 TIMES DAILY
Qty: 60 G | Refills: 6 | Status: SHIPPED | OUTPATIENT
Start: 2019-12-03 | End: 2019-12-04 | Stop reason: SDUPTHER

## 2019-12-03 NOTE — ASSESSMENT & PLAN NOTE
Due for yearly mammogram.  Last US in 2017 was stable.  No reported breast changes.  Order needed.

## 2019-12-03 NOTE — PROGRESS NOTES
"Chief Complaint   Patient presents with   • Annual Exam   • Medication Refill       Subjective:   Rosita Chu is a 54 y.o. female here today for annual preventative exam    Lichen sclerosus et atrophicus of the vulva  Reports recent flare ups on  Vulva/anus.  clobatesol is helping this issue.  Needs refill.     Abnormal mammogram of both breasts  Due for yearly mammogram.  Last US in 2017 was stable.  No reported breast changes.  Order needed.     Depression  Patient reports she is doing well at this time.  Holidays are hard since the death of her  (suicide).  She does have grandbabies near and this is helping.  She is also volunteering and this helps.  No meds at this time.      Requesting influenza vaccine today.  Due for mammogram.     Current medicines (including changes today)  Current Outpatient Medications   Medication Sig Dispense Refill   • clobetasol (TEMOVATE) 0.05 % Ointment Apply 1 Application to affected area(s) 2 times a day. 60 g 6   • clobetasol (TEMOVATE) 0.05 % OINT APPLY TO AFFECTED AREA(S) 2 TIMES A DAY. AS DIRECTED 30 g 2     No current facility-administered medications for this visit.      She  has a past medical history of Allergic rhinitis (10/6/2014), Anxiety, ASTHMA, and Depression.    ROS as stated in hpi  No chest pain, no shortness of breath, no abdominal pain       Objective:     BP (!) 162/100 (BP Location: Left arm, Patient Position: Sitting)   Pulse 73   Temp 36.7 °C (98 °F) (Temporal)   Resp 14   Ht 1.702 m (5' 7\")   Wt 101.2 kg (223 lb)   SpO2 96%  Body mass index is 34.93 kg/m². blood pressure slightly elevated.  Blood press ure on  Retake 132/84  Physical Exam:  Constitutional: Alert, no distress.  Skin: Warm, dry, good turgor,no cyanosis, no rashes in visible areas.  Eye: Equal, round and reactive, conjunctiva clear, lids normal.  Ears: No tenderness, no discharge.  External canals are without any significant edema or erythema.  Tympanic membranes are " without any inflammation, no effusion.  Gross auditory acuity is intact.  Nose: symmetrical without tenderness, no discharge.  Mouth/Throat: lips without lesion.  Oropharynx clear.  Throat without erythema, exudates or tonsillar enlargement.  Neck: Trachea midline, no masses, no obvious thyroid enlargement.. No cervical or supraclavicular lymphadenopathy. Range of motion within normal limits.  Neuro: Cranial nerves 2-12 grossly intact.  No sensory deficit.  Respiratory: Unlabored respiratory effort, lungs clear to auscultation, no wheezes, no ronchi.  Cardiovascular: Normal S1, S2, no murmur, no edema.  Abdomen: Soft, non-tender, no masses, no guarding,  no hepatosplenomegaly.  Psych: Alert and oriented x3, normal affect and mood and judgement.        Assessment and Plan:   The following treatment plan was discussed    1. Lichen sclerosus et atrophicus of the vulva  Chronic, ongoing. Recent flare up.  Needs refills.  Refill sent. Monitor and follow.   - CBC WITH DIFFERENTIAL; Future  - Comp Metabolic Panel; Future    2. Need for vaccination  Due for vaccine.  No acute illness  I have placed the below orders and discussed them with an approved delegating provider.  The MA is performing the below orders under the direction of Dr. Reed MD.    - Influenza Vaccine Quad Injection (PF)    3. Preventative health care  Due for labs.  Orders provided.   - Lipid Profile; Future    4. Screening for breast cancer  Due for mammogram.  Order provided.   - MA-SCREEN MAMMO W/CAD-BILAT; Future    5. Abnormal mammogram of both breasts  See #4    6. Other depression  Chronic, ongoing. Stable at this time.  No suicidal thoughts/plan.  No meds.  Continue good volunteer work/exercise and family gatherings.       Followup: Return in about 1 year (around 12/3/2020).         Educated in proper administration of medication(s) ordered today including safety, possible SE, risks, benefits, rationale and alternatives to therapy.     Please  note that this dictation was created using voice recognition software. I have made every reasonable attempt to correct obvious errors, but I expect that there are errors of grammar and possibly content that I did not discover before finalizing the note.

## 2019-12-03 NOTE — ASSESSMENT & PLAN NOTE
Patient reports she is doing well at this time.  Holidays are hard since the death of her  (suicide).  She does have grandbabies near and this is helping.  She is also volunteering and this helps.  No meds at this time.

## 2019-12-04 RX ORDER — CLOBETASOL PROPIONATE 0.5 MG/G
1 OINTMENT TOPICAL 2 TIMES DAILY
Qty: 60 G | Refills: 6 | Status: SHIPPED | OUTPATIENT
Start: 2019-12-04 | End: 2021-03-30 | Stop reason: SDUPTHER

## 2019-12-04 NOTE — TELEPHONE ENCOUNTER
Was the patient seen in the last year in this department? Yes    Does patient have an active prescription for medications requested? Yes [facility]     Received Request Via: Patient

## 2019-12-05 NOTE — TELEPHONE ENCOUNTER
Requested Prescriptions     Signed Prescriptions Disp Refills   • clobetasol (TEMOVATE) 0.05 % Ointment 60 g 6     Sig: Apply 1 Application to affected area(s) 2 times a day.     Authorizing Provider: SAMRA REHMAN A.P.R.N.

## 2020-01-06 ENCOUNTER — HOSPITAL ENCOUNTER (OUTPATIENT)
Dept: RADIOLOGY | Facility: MEDICAL CENTER | Age: 55
End: 2020-01-06
Attending: NURSE PRACTITIONER
Payer: COMMERCIAL

## 2020-01-06 DIAGNOSIS — Z12.39 SCREENING FOR BREAST CANCER: ICD-10-CM

## 2020-01-06 PROCEDURE — 77067 SCR MAMMO BI INCL CAD: CPT

## 2020-01-07 ENCOUNTER — TELEPHONE (OUTPATIENT)
Dept: MEDICAL GROUP | Facility: PHYSICIAN GROUP | Age: 55
End: 2020-01-07

## 2020-01-07 NOTE — LETTER
January 7, 2020         Rosita Chu  [address] [facility] [address]        Dear Rosita:      Below are the results from your recent visit:    Please notify patient that her mammogram results have been read.  There was no evidence of malignancy or suspicious areas of concern.   Recommend yearly follow up screening.   SRAVAN Beach     Resulted Orders   MA-SCREEN MAMMO W/CAD-BILAT    Narrative    1/6/2020 9:11 AM    HISTORY/REASON FOR EXAM:  Routine Mammographic Screening.      TECHNIQUE/EXAM DESCRIPTION:  Bilateral digital screening mammography was performed and interpreted with CAD.    COMPARISON:   06/19/2017    FINDINGS:    The breast parenchyma is heterogeneously dense which may obscure small masses. There is no dominant mass, suspicious calcification, or any secondary malignant sign.      Impression    1.  Breasts are heterogeneously dense, which may obscure small masses. No gross evidence of malignancy and no interval change.    2.  Screening mammogram in one year is recommended.      R1 - Category 1:  Negative     If you have any questions or concerns, please don't hesitate to call.    Electronically Signed

## 2020-01-07 NOTE — TELEPHONE ENCOUNTER
----- Message from SRAVAN Beach sent at 1/7/2020 12:44 PM PST -----  Please notify patient that her mammogram results have been read.  There was no evidence of malignancy or suspicious areas of concern.   Recommend yearly follow up screening.  SRAVAN Beach

## 2020-08-04 ENCOUNTER — TELEPHONE (OUTPATIENT)
Dept: MEDICAL GROUP | Facility: PHYSICIAN GROUP | Age: 55
End: 2020-08-04

## 2020-08-10 NOTE — TELEPHONE ENCOUNTER
FINAL PRIOR AUTHORIZATION STATUS:    1.  Name of Medication & Dose: clobetasol (TEMOVATE) 0.05 % Ointment     2. Prior Auth Status: Approved through 08/10/2021     3. Action Taken: Pharmacy Notified: N\A Patient Notified: N\A

## 2020-12-07 ENCOUNTER — HOSPITAL ENCOUNTER (OUTPATIENT)
Facility: MEDICAL CENTER | Age: 55
End: 2020-12-07
Attending: NURSE PRACTITIONER
Payer: COMMERCIAL

## 2020-12-07 ENCOUNTER — OFFICE VISIT (OUTPATIENT)
Dept: MEDICAL GROUP | Facility: PHYSICIAN GROUP | Age: 55
End: 2020-12-07
Payer: COMMERCIAL

## 2020-12-07 VITALS
SYSTOLIC BLOOD PRESSURE: 168 MMHG | BODY MASS INDEX: 35.16 KG/M2 | RESPIRATION RATE: 16 BRPM | TEMPERATURE: 96.9 F | HEIGHT: 67 IN | WEIGHT: 224 LBS | HEART RATE: 76 BPM | DIASTOLIC BLOOD PRESSURE: 100 MMHG | OXYGEN SATURATION: 97 %

## 2020-12-07 DIAGNOSIS — Z23 NEED FOR VACCINATION: ICD-10-CM

## 2020-12-07 DIAGNOSIS — Z01.419 WELL WOMAN EXAM WITH ROUTINE GYNECOLOGICAL EXAM: ICD-10-CM

## 2020-12-07 DIAGNOSIS — B35.1 TOENAIL FUNGUS: ICD-10-CM

## 2020-12-07 DIAGNOSIS — Z12.4 SCREENING FOR CERVICAL CANCER: ICD-10-CM

## 2020-12-07 DIAGNOSIS — L57.0 AK (ACTINIC KERATOSIS): ICD-10-CM

## 2020-12-07 DIAGNOSIS — F32.89 OTHER DEPRESSION: ICD-10-CM

## 2020-12-07 DIAGNOSIS — Z12.83 SCREENING FOR SKIN CANCER: ICD-10-CM

## 2020-12-07 PROBLEM — J34.89 FRONTAL SINUS PAIN: Status: RESOLVED | Noted: 2017-05-18 | Resolved: 2020-12-07

## 2020-12-07 PROCEDURE — 87624 HPV HI-RISK TYP POOLED RSLT: CPT

## 2020-12-07 PROCEDURE — 90686 IIV4 VACC NO PRSV 0.5 ML IM: CPT | Performed by: NURSE PRACTITIONER

## 2020-12-07 PROCEDURE — 99396 PREV VISIT EST AGE 40-64: CPT | Mod: 25 | Performed by: NURSE PRACTITIONER

## 2020-12-07 PROCEDURE — 90471 IMMUNIZATION ADMIN: CPT | Performed by: NURSE PRACTITIONER

## 2020-12-07 PROCEDURE — 99000 SPECIMEN HANDLING OFFICE-LAB: CPT | Performed by: NURSE PRACTITIONER

## 2020-12-07 PROCEDURE — 88175 CYTOPATH C/V AUTO FLUID REDO: CPT

## 2020-12-07 NOTE — PROGRESS NOTES
CC:  Pap/Well Woman Exam    History of present illness:  Rosita Chu is 55 y.o.white female presenting today for well woman exam with gynecological exam and Pap smear.   She reports her periods are menopausal.    Diet working on decreasing sugar. Exercise working on getting back to walking    No problem-specific Assessment & Plan notes found for this encounter.  Well woman exam with routine gynecological exam  Here for annual exam and PAP.  Reporting chlobatesol is helping with flare.     Toenail fungus  Left big toe with fungal infection and requesting medication for this.  Recent injury to that toe.     Depression  Reports increasing stress.  Would like to try some zoloft 50 mg.  Will recheck in one month.  No suicidal ideation.  Stress with family.     AK (actinic keratosis)  Reports recurring sore on chin.  Would like to return to her dermatologist.  Referral sent.       Past Medical History:   Diagnosis Date   • Allergic rhinitis 10/6/2014   • Anxiety    • ASTHMA    • Depression        Past Surgical History:   Procedure Laterality Date   • LUMBAR LAMINECTOMY DISKECTOMY  2005    L4-5   • HYSTEROSCOPY THERMAL ABLATION     • TONSILLECTOMY      17 years old       Outpatient Encounter Medications as of 12/7/2020   Medication Sig Dispense Refill   • clobetasol (TEMOVATE) 0.05 % Ointment Apply 1 Application to affected area(s) 2 times a day. 60 g 6   • [DISCONTINUED] clobetasol (TEMOVATE) 0.05 % OINT APPLY TO AFFECTED AREA(S) 2 TIMES A DAY. AS DIRECTED 30 g 2     No facility-administered encounter medications on file as of 12/7/2020.        Patient Active Problem List    Diagnosis Date Noted   • Acne 11/22/2017   • Well woman exam with routine gynecological exam 11/22/2017   • Obesity (BMI 30.0-34.9) 11/18/2016   • Abnormal mammogram of both breasts 11/18/2016   • Allergic rhinitis 10/06/2014   • Lichen sclerosus et atrophicus of the vulva 01/22/2012   • Anxiety    • Depression    • ASTHMA        .  Social  History     Socioeconomic History   • Marital status:      Spouse name: Not on file   • Number of children: Not on file   • Years of education: Not on file   • Highest education level: Not on file   Occupational History   • Not on file   Social Needs   • Financial resource strain: Not on file   • Food insecurity     Worry: Not on file     Inability: Not on file   • Transportation needs     Medical: Not on file     Non-medical: Not on file   Tobacco Use   • Smoking status: Never Smoker   • Smokeless tobacco: Never Used   Substance and Sexual Activity   • Alcohol use: Yes     Alcohol/week: 1.2 oz     Types: 2 Glasses of wine per week     Comment: month   • Drug use: No   • Sexual activity: Not Currently     Partners: 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: , 3 children, Manicurist   Lifestyle   • Physical activity     Days per week: Not on file     Minutes per session: Not on file   • Stress: Not on file   Relationships   • Social connections     Talks on phone: Not on file     Gets together: Not on file     Attends Bahai service: Not on file     Active member of club or organization: Not on file     Attends meetings of clubs or organizations: Not on file     Relationship status: Not on file   • Intimate partner violence     Fear of current or ex partner: Not on file     Emotionally abused: Not on file     Physically abused: Not on file     Forced sexual activity: Not on file   Other Topics Concern   • Not on file   Social History Narrative   • Not on file       Family History   Problem Relation Age of Onset   • Hypertension Mother    • Stroke Father    • Alcohol/Drug Sister    • Alcohol/Drug Sister    • Alcohol/Drug Brother          ROS:  Denies Weight loss, fatigue, chest pain, SOB, bowel or bladder changes. No significant dysmenorrhea, concerning vaginal discharge or irritation, no dyspareunia or postcoital bleeding. Denies h/o migraine with aura. Denies musculoskeletal, neurological, or psychiatric problems.      BP  "(!) 168/100 (BP Location: Left arm, Patient Position: Sitting)   Pulse 76   Temp 36.1 °C (96.9 °F) (Temporal)   Resp 16   Ht 1.702 m (5' 7\")   Wt 101.6 kg (224 lb)   SpO2 97%   BMI 35.08 kg/m²     GEN:  Appears well and in no apparent distress   NECK:  Supple without adenopathy or thyromegaly  LUNGS:  Clear and equal. No wheeze, ronchi, or rales.  CV:  RRR, S1, S2. No murmur.  Pedal pulses 2+ bilaterally.  BREAST:  Symmetrical without masses. No nipple discharge.  ABD:  Soft, non-tender, non-distended, normal bowel sounds.  No hepatosplenomegaly.  :  Normal external female genitalia.  Vaginal canal clear.  Cervix appears normal. Specimen collected from transformation zone. Bimanual exam:  No CMT, normal size uterus without masses or tenderness; no adnexal masses or tenderness.      Assessment and plan    There are no diagnoses linked to this encounter.    F/u pending results    A chaperone was offered to the patient during today's exam. Patient declined chaperone.  "

## 2020-12-07 NOTE — ASSESSMENT & PLAN NOTE
Reports increasing stress.  Would like to try some zoloft 50 mg.  Will recheck in one month.  No suicidal ideation.  Stress with family.

## 2020-12-07 NOTE — ASSESSMENT & PLAN NOTE
Left big toe with fungal infection and requesting medication for this.  Recent injury to that toe.

## 2020-12-09 DIAGNOSIS — Z12.4 SCREENING FOR CERVICAL CANCER: ICD-10-CM

## 2020-12-14 ENCOUNTER — TELEPHONE (OUTPATIENT)
Dept: MEDICAL GROUP | Facility: PHYSICIAN GROUP | Age: 55
End: 2020-12-14

## 2020-12-14 NOTE — LETTER
"December 14, 2020         Melida Chu  [address] [facility] [address]        Dear Melida:      Below are the results from your recent visit:    Please notify patient that her PAP results are back and there is no evidence of infection or malignancy.  Recommend repeat testing in 3 years.   Take care   SRAVAN Beach Orders   THINPREP PAP WITH HPV   Result Value Ref Range    Cytology Reg See Path Report       Comment:      A separate pathology report will follow after the Cytology  specimen has been received by the laboratory and the results  are signed out by a pathologist.  Please see \"Pathology GYN Specimen\" order for these results.      Source Endo/Cervical     HPV Genotype 16 Negative Negative    HPV Genotype 18 Negative Negative    HPV Other High Risk Genotypes Negative Negative      Comment:      This test detects the high-risk HPV types 16, 18, 31, 33,  35, 39, 45, 51, 52, 56, 58, 59, 66, and 68. It is intended  for use in women 21 years and older with ASC-US cervical  cytology results and in women 30 years and older with  positive high-risk HPV results. Sensitivity may be affected  by specimen collection methods, stage of infection, and the  presence of interfering substances. Results should be  interpreted in conjunction with other available laboratory  and clinical data.      Narrative    Clinical Information:->01 Routine Check-up   Pathology Gynecology Specimen    Narrative    [facility]                          DEPARTMENT OF PATHOLOGY                   [address] [place] [address]  [address]              Phone [phone]          Fax [phone]  Horace Meza M.D.     Marleen Skaggs M.D.     Joshua Vail M.D.                            Kim Mosquera M.D.    Nancy New D.O.     ANA Bliss M.D.    Patient:    MELIDA CHU        Specimen    [id number]                                           " #:      Copies to:                         CERVICOVAGINAL CYTOLOGY REPORT      INTERPRETATION:  NEGATIVE FOR INTRAEPITHELIAL LESION OR MALIGNANCY.    SPECIMEN ADEQUACY:  Satisfactory for Evaluation.  No endocervical cells/transformation zone  component present.                                          Report electronically signed by:                                      CRISTI MORENO(ASCP)  ------------------------------------------------------------------------    Z12.4,  ,  This liquid based ThinPrep Pap test is screened with the use of an  image guided system.  Cervicovaginal Papanicolaou (Pap) smears are  screening tests with a well-documented false-negative rate.  A single  negative test is significantly less accurate than multiple negative  tests in successive screening periods.                                   RELATED LABORATORY RESULTS      Ordered by: NICOLE Ord Date: 12/09/2020 Ord Time: 12:05  Test Name        Collected  Result          Abnor  Range     Units                    Date                      mal    Specimen Source  12/07/202  Endo/Cervical                   0    HPV Genotype 16  12/07/202  Negative               Negative                   0    HPV Genotype 18  12/07/202  Negative               Negative                   0    HPV Other High   12/07/202  Negative               Negative  Risk Genotypes   0        Report electronically signed by:  CRISTI MORENO(ASCP)  Diagnosis performed at: [facility]  Pathology  Department, [address] [place] [address]  [id number]      Printed 00/00/0000 [id number] MELIDA DUNCAN   Page 1 of 1 MRN#:[id number]     If you have any questions or concerns, please don't hesitate to call.    Electronically Signed

## 2020-12-14 NOTE — TELEPHONE ENCOUNTER
----- Message from SRAVAN Beach sent at 12/12/2020  1:41 PM PST -----  Please notify patient that her PAP results are back and there is no evidence of infection or malignancy.  Recommend repeat testing in 3 years.  Take care  SRAVAN Beach

## 2021-02-08 ENCOUNTER — TELEPHONE (OUTPATIENT)
Dept: MEDICAL GROUP | Facility: PHYSICIAN GROUP | Age: 56
End: 2021-02-08

## 2021-02-08 NOTE — TELEPHONE ENCOUNTER
Phone Number Called: [phone] (home)       Call outcome: Did not leave a detailed message. Requested patient to call back.    Message: Please advise patient that I cannot just prescribe blood pressure medication without evaluating her.  She could be evaluated today in urgent care with a follow up appointment with me later.     PATRICK Beach.

## 2021-02-08 NOTE — TELEPHONE ENCOUNTER
Phone Number Called: [phone] (home)       Call outcome: Spoke to patient regarding message below.    Message: Patient advised to be sen in the Er for chest pain, she states it hurts to push on her chest by the collar bone area and she is wondering if it is the medication she is taking? She just thinks she needs blood pressure medication.

## 2021-02-08 NOTE — TELEPHONE ENCOUNTER
VOICEMAIL  1. Caller Name: Rosita                      Call Back Number: [phone] (home)       2. Message: Patient is having dental work done tomorrow and her blood pressure has been high and she is having some chest pains and is thinking she needs BP medication and wanted to see you today. You are totally full. Please advise     3. Patient approves office to leave a detailed voicemail/MyChart message: N\A

## 2021-03-30 RX ORDER — CLOBETASOL PROPIONATE 0.5 MG/G
1 OINTMENT TOPICAL 2 TIMES DAILY
Qty: 60 G | Refills: 2 | Status: SHIPPED | OUTPATIENT
Start: 2021-03-30 | End: 2022-01-03 | Stop reason: SDUPTHER

## 2021-03-30 NOTE — TELEPHONE ENCOUNTER
Requested Prescriptions     Signed Prescriptions Disp Refills   • clobetasol (TEMOVATE) 0.05 % Ointment 60 g 2     Sig: Apply 1 Application topically 2 times a day.     Authorizing Provider: SAMRA REHMAN A.P.R.N.

## 2021-12-27 ENCOUNTER — OFFICE VISIT (OUTPATIENT)
Dept: URGENT CARE | Facility: PHYSICIAN GROUP | Age: 56
End: 2021-12-27
Payer: COMMERCIAL

## 2021-12-27 VITALS
DIASTOLIC BLOOD PRESSURE: 102 MMHG | TEMPERATURE: 98.1 F | HEIGHT: 67 IN | WEIGHT: 221.4 LBS | RESPIRATION RATE: 16 BRPM | BODY MASS INDEX: 34.75 KG/M2 | SYSTOLIC BLOOD PRESSURE: 170 MMHG | HEART RATE: 95 BPM | OXYGEN SATURATION: 96 %

## 2021-12-27 DIAGNOSIS — I10 HYPERTENSION, UNSPECIFIED TYPE: ICD-10-CM

## 2021-12-27 DIAGNOSIS — R00.2 PALPITATIONS: ICD-10-CM

## 2021-12-27 DIAGNOSIS — R07.9 CHEST PAIN, UNSPECIFIED TYPE: ICD-10-CM

## 2021-12-27 PROCEDURE — 93000 ELECTROCARDIOGRAM COMPLETE: CPT | Performed by: FAMILY MEDICINE

## 2021-12-27 PROCEDURE — 99214 OFFICE O/P EST MOD 30 MIN: CPT | Performed by: FAMILY MEDICINE

## 2021-12-27 RX ORDER — ASPIRIN 81 MG/1
324 TABLET, CHEWABLE ORAL ONCE
Status: COMPLETED | OUTPATIENT
Start: 2021-12-27 | End: 2021-12-27

## 2021-12-27 RX ADMIN — ASPIRIN 324 MG: 81 TABLET, CHEWABLE ORAL at 13:57

## 2021-12-27 ASSESSMENT — ENCOUNTER SYMPTOMS
PALPITATIONS: 1
FEVER: 0
CHILLS: 0
NAUSEA: 0
MYALGIAS: 0
NERVOUS/ANXIOUS: 1
VOMITING: 0
SORE THROAT: 0
SHORTNESS OF BREATH: 0
DIZZINESS: 0
COUGH: 0

## 2021-12-27 NOTE — PROGRESS NOTES
Subjective:   Rosita Chu is a 56 y.o. female who presents for Anxiety (Causing chest discomfort & high BP )        Palpitations   This is a recurrent problem. Episode onset: 1 year, palpitations more prominent last night with report of recent psychosocial stressors. Exacerbated by: Recent psychosocial stressors. Associated symptoms include anxiety, chest pain (decribed as sensation of heart beating and chest pressure) and malaise/fatigue. Pertinent negatives include no coughing, dizziness, fever, nausea, shortness of breath or vomiting. Treatments tried: Relative rest. Elevated blood pressure     PMH:  has a past medical history of Allergic rhinitis (10/6/2014), Anxiety, ASTHMA, and Depression.  MEDS:   Current Outpatient Medications:   •  clobetasol (TEMOVATE) 0.05 % Ointment, Apply 1 Application topically 2 times a day., Disp: 60 g, Rfl: 2    Current Facility-Administered Medications:   •  aspirin (ASA) chewable tab 324 mg, 324 mg, Oral, Once, Bandar Freitas M.D.  ALLERGIES:   Allergies   Allergen Reactions   • Ees [Erythromycin] Hives   • Pcn [Penicillins]      SURGHX:   Past Surgical History:   Procedure Laterality Date   • LUMBAR LAMINECTOMY DISKECTOMY  2005    L4-5   • HYSTEROSCOPY THERMAL ABLATION     • TONSILLECTOMY      17 years old     SOCHX:  reports that she has never smoked. She has never used smokeless tobacco. She reports current alcohol use of about 1.2 oz of alcohol per week. She reports that she does not use drugs.  FH:   Family History   Problem Relation Age of Onset   • Hypertension Mother    • Stroke Father    • Alcohol/Drug Sister    • Alcohol/Drug Sister    • Alcohol/Drug Brother      Review of Systems   Constitutional: Positive for malaise/fatigue. Negative for chills and fever.   HENT: Negative for sore throat.    Respiratory: Negative for cough and shortness of breath.    Cardiovascular: Positive for chest pain (decribed as sensation of heart beating and chest pressure) and  "palpitations.   Gastrointestinal: Negative for nausea and vomiting.   Musculoskeletal: Negative for myalgias.   Skin: Negative for rash.   Neurological: Negative for dizziness.   Psychiatric/Behavioral: The patient is nervous/anxious.         Objective:   BP (!) 170/102 (BP Location: Left arm, Patient Position: Sitting, BP Cuff Size: Adult long)   Pulse 95   Temp 36.7 °C (98.1 °F) (Temporal)   Resp 16   Ht 1.702 m (5' 7\")   Wt 100 kg (221 lb 6.4 oz)   SpO2 96%   BMI 34.68 kg/m²   Physical Exam  Vitals and nursing note reviewed.   Constitutional:       General: She is not in acute distress.     Appearance: She is well-developed.   HENT:      Head: Normocephalic and atraumatic.      Right Ear: External ear normal.      Left Ear: External ear normal.      Nose: Nose normal.      Mouth/Throat:      Mouth: Mucous membranes are moist.   Eyes:      Conjunctiva/sclera: Conjunctivae normal.   Cardiovascular:      Rate and Rhythm: Normal rate and regular rhythm.   Pulmonary:      Effort: Pulmonary effort is normal. No respiratory distress.      Breath sounds: Normal breath sounds. No wheezing or rhonchi.   Abdominal:      General: There is no distension.   Musculoskeletal:         General: Normal range of motion.   Skin:     General: Skin is warm and dry.   Neurological:      General: No focal deficit present.      Mental Status: She is alert and oriented to person, place, and time. Mental status is at baseline.      Gait: Gait (gait at baseline) normal.   Psychiatric:         Judgment: Judgment normal.     EKG: Normal sinus rhythm at 66, ST segment elevation V1, V2, V3 with flattening T waves V2 and V3, no previous EKG      Assessment/Plan:   1. Chest pain, unspecified type  - aspirin (ASA) chewable tab 324 mg    2. Palpitations  - EKG - Clinic Performed    3. Hypertension, unspecified type  - EKG - Clinic Performed        Medical Decision Making/Course:  In the context of the presentation of acute chest pain with " palpitations and EKG findings of ST segment elevation, there is a concern for acute coronary syndrome and emergency department evaluation and management is warranted.  The patient requested [facility] emergency department.  The patient deferred EMS ambulance transfer.  The [facility] emergency department charge was contacted and accepted the patient in transfer via private vehicle.    In the course of preparing for this visit with review of the pertinent past medical history, recent and past clinic visits, current medications, and performing chart, immunization, medical history and medication reconciliation, and in the further course of obtaining the current history pertinent to the clinic visit today, performing an exam and evaluation, ordering and independently evaluating labs, tests, and/or procedures, prescribing any recommended new medications as noted above including administration of aspirin 324 mg orally once during urgent care course, providing any pertinent counseling and education and recommending further coordination of care, at least  40 minutes of total time were spent during this encounter.      Please note that this dictation was created using voice recognition software. I have worked with consultants from the vendor as well as technical experts from [facility] [employer] to optimize the interface. I have made every reasonable attempt to correct obvious errors, but I expect that there are errors of grammar and possibly content that I did not discover before finalizing the note.

## 2022-01-03 ENCOUNTER — OFFICE VISIT (OUTPATIENT)
Dept: MEDICAL GROUP | Facility: PHYSICIAN GROUP | Age: 57
End: 2022-01-03
Payer: COMMERCIAL

## 2022-01-03 VITALS
TEMPERATURE: 98.4 F | BODY MASS INDEX: 34.84 KG/M2 | DIASTOLIC BLOOD PRESSURE: 70 MMHG | SYSTOLIC BLOOD PRESSURE: 130 MMHG | HEIGHT: 67 IN | HEART RATE: 72 BPM | RESPIRATION RATE: 16 BRPM | OXYGEN SATURATION: 95 % | WEIGHT: 222 LBS

## 2022-01-03 DIAGNOSIS — I10 PRIMARY HYPERTENSION: ICD-10-CM

## 2022-01-03 DIAGNOSIS — R00.2 PALPITATIONS: Primary | ICD-10-CM

## 2022-01-03 DIAGNOSIS — Z13.6 SCREENING FOR CARDIOVASCULAR CONDITION: ICD-10-CM

## 2022-01-03 DIAGNOSIS — Z12.31 ENCOUNTER FOR SCREENING MAMMOGRAM FOR MALIGNANT NEOPLASM OF BREAST: ICD-10-CM

## 2022-01-03 DIAGNOSIS — Z13.29 SCREENING FOR ENDOCRINE DISORDER: ICD-10-CM

## 2022-01-03 DIAGNOSIS — N90.4 LICHEN SCLEROSUS ET ATROPHICUS OF THE VULVA: ICD-10-CM

## 2022-01-03 PROCEDURE — 99214 OFFICE O/P EST MOD 30 MIN: CPT | Performed by: NURSE PRACTITIONER

## 2022-01-03 RX ORDER — CLOBETASOL PROPIONATE 0.5 MG/G
1 OINTMENT TOPICAL 2 TIMES DAILY
Qty: 60 G | Refills: 2 | Status: SHIPPED | OUTPATIENT
Start: 2022-01-03 | End: 2023-10-24 | Stop reason: SDUPTHER

## 2022-01-03 RX ORDER — LISINOPRIL 10 MG/1
10 TABLET ORAL DAILY
Qty: 30 TABLET | Refills: 3 | Status: SHIPPED | OUTPATIENT
Start: 2022-01-03 | End: 2022-02-08

## 2022-01-03 RX ORDER — LISINOPRIL 10 MG/1
10 TABLET ORAL DAILY
COMMUNITY
Start: 2021-12-27 | End: 2022-01-03 | Stop reason: SDUPTHER

## 2022-01-03 ASSESSMENT — PATIENT HEALTH QUESTIONNAIRE - PHQ9: CLINICAL INTERPRETATION OF PHQ2 SCORE: 0

## 2022-01-03 NOTE — ASSESSMENT & PLAN NOTE
Acute condition. Patient states that she has monthly episodes of an odd sensation in her chest with a little bit of chest pain.  She was recently seen in urgent care for this where an EKG was performed.  The physician who saw her referred her to the ER due to ST elevation and wanted her to go by ambulance. Patient reports that she did not go by ambulance but she did have her son drive her to [facility].  I do not have those records for review.  Her EKG that was performed at urgent care is consistent with possible left ventricular hypertrophy, however, and I do think this warrants consultation and evaluation by cardiology.  Patient is agreeable to this, and referral was placed today.    Occupational Therapy  Daily Note     Name: Briseyda Rahman  : 1966  MRN: [id number]  Diagnosis: cervical sprain    Visit Information:   Onset Date: 20  OT Insurance Information: [employer]  Progress Note Counter: 6    Date: 2020  Time:   OT Manual therapy 20 minutes for 1 unit(s), CPT 83135  1:11-1:31 PM  OT Therapeutic activities 29 minutes for 2 unit(s), CPT 12531  1:31-2:00 PM  OT mechanical traction 10 minutes for 1 units(s), CPT 17493  1:01-1:11 PM     OT Individual Minutes  Time In: 1301  Time Out: 1400  Minutes: 61    Referring Practitioner: Dr. Kaylyn Henriquez          Subjective:     \"My pain goes away when I lift my head off my shoulders. \", pt states. Pain rating:   Pre-treatment pain:    3/10  \"Pain is in back of my neck to my spine right above my shoulder blades. \"    Action for pain:   traction, MFR    Pain after treatment:      4/10         Focus of treatment was on the following:   Decreasing pain and improving postural strength     Objective:    Treatment Activity:     Modality:     Mechanical traction cervical spine while supine on mat, 13 lbs. traction x 10 minutes. MFR/Manual:   Pt. tolerated soft tissue mobilization of bilateral trapezius and levator muscles while seated in chair. Neck extensor muscle massage from C3-C7 area to alleviate soft tissue restrictions. Pt. followed through with active neck ROM while maintaining good posture. Pt. instructed in and performed cervical isometric ex. for HEP. Pt. demonstrates good understanding. Handout provided. Pt. performed AROM BUEs in seated position using large dowel fauzia to raise UEs into flexion, horizontal ab/adduction incorporating upper trunk and neck rotation and shoulder abduction while maintaining cervical alignment. No pain complaints reported. Pt. performed light resistance theraband activities with BUEs, completing pull backs with elbows flexed and shoulder horizontal abduction with elbow extension.   Pt. reports minimal increase in pain. Pt. states,\"No pain in shoulders or tingling in my hand. \"         Education/HEP: Isometrics of head/neck. Provided written handout. Pt. demonstrates good understanding. Discussed previous HEP: yes, Pt verbally confirmed compliant with HEP's    Comments: Pt. reports she continues to work light duty without issues. Assessment:   Pt tolerated treatment well. Pain is decreasing with improved mobility of head and neck. Pt. demonstrates good follow through with HEP. Pt. has met goals 2,3,5 and 6. Plan:   Continue POC    Goals:     Long term goals  Long term goal 1: Patient will report pain 3-4 or less during functional activities. Long term goal 2: Patient  will be independent with all recommended HEP's, adaptive strategies, and adaptive techniques. Long term goal 3: Patient will report decreased frequency of numbness/tingling in BUEs  Long term goal 4: Patient  will increase BUE shoulder strength from current by 1/2  to increase performance with I/ADL's. Long term goal 5: Patient will decrease fascial restrictions in neck and trapezius muscles to decrease pain during functional activities  Long term goals 6: Patient will be IND with ECWS techniques  Long term goal 7: Pt. will demonstrate good postural set for head and neck during all lifting tasks and standing for improved work tolerance.     JULIAN Vsaquez/L   11/11/2020  2:30 PM

## 2022-01-07 NOTE — ASSESSMENT & PLAN NOTE
Chronic condition, stable.  Patient does well with clobestasol ointment.  She does need a refill today.   Continue current regimen.

## 2022-01-07 NOTE — ASSESSMENT & PLAN NOTE
Chronic condition, stable.  Patient's BP is 130/70 in the office today.  She takes lisinopril 10 mg daily and tolerates this well.  She states that she does take her BP at home and it is usually in this range or under.  Continue current regimen.

## 2022-01-07 NOTE — PROGRESS NOTES
Subjective:     CC: ER follow up    HPI:   Rosita presents today as a new patient to establish care.  She is established with [facility]. Her past medical, family, social and surgical history were reviewed today in detail. She states that she recently went to the urgent care on 12/27/2021 due to chest pain and heart palpitations, and was referred urgently to the ED due to EKG changes.  She did go to the ED at [facility] that day. She was given another EKG and troponins were drawn. She does bring in copies of her lab work with her showing negative trops and all other lab work was WNL.  She was referred to PCP for further evaluation.  Patient states that she has not had another episode since that visit, however, she does get these odd episodes of her heart feeling weird and accompanying chest pain about monthly, and has for several months.  Otherwise, she would like to update her HCM today.    Past Medical History:   Diagnosis Date   • Allergic rhinitis 10/6/2014   • Anxiety    • ASTHMA    • Depression        Social History     Tobacco Use   • Smoking status: Never Smoker   • Smokeless tobacco: Never Used   Vaping Use   • Vaping Use: Never used   Substance Use Topics   • Alcohol use: Yes     Alcohol/week: 1.2 oz     Types: 2 Glasses of wine per week     Comment: month   • Drug use: No       Current Outpatient Medications Ordered in Epic   Medication Sig Dispense Refill   • lisinopril (PRINIVIL) 10 MG Tab Take 1 Tablet by mouth every day. 30 Tablet 3   • clobetasol (TEMOVATE) 0.05 % Ointment Apply 1 Application topically 2 times a day. 60 g 2     No current Epic-ordered facility-administered medications on file.       Allergies:  Ees [erythromycin] and Pcn [penicillins]    Health Maintenance: Completed    ROS:  Gen: no fevers/chills, no changes in weight  Eyes: no changes in vision  ENT: no sore throat, no hearing loss, no bloody nose  Pulm: no sob, no cough  CV: no chest pain, no palpitations  GI: no  "nausea/vomiting, no diarrhea  : no dysuria  MSk: no myalgias  Skin: no rash  Neuro: no headaches, no numbness/tingling  Heme/Lymph: no easy bruising      Objective:       Exam:  /70 (BP Location: Left arm, Patient Position: Sitting, BP Cuff Size: Adult long)   Pulse 72   Temp 36.9 °C (98.4 °F) (Temporal)   Resp 16   Ht 1.702 m (5' 7\")   Wt 101 kg (222 lb)   SpO2 95%   Breastfeeding No   BMI 34.77 kg/m²  Body mass index is 34.77 kg/m².    Gen: Alert and oriented, No apparent distress.  Neck: Neck is supple without lymphadenopathy.  No carotid bruits.  Lungs: Normal effort, CTA bilaterally, no wheezes, rhonchi, or rales  CV: Regular rate and rhythm. No murmurs, rubs, or gallops.  Ext: No clubbing, cyanosis, edema.    Labs: 12/27/2021 from [facility]:  CBC, CMP WNL.  Negative troponin x2.     Assessment & Plan:     56 y.o. female with the following -     1. Palpitations   condition. Patient states that she has monthly episodes of an odd sensation in her chest with a little bit of chest pain.  She was recently seen in urgent care for this where an EKG was performed.  The physician who saw her referred her to the ER due to ST elevation and wanted her to go by ambulance. Patient reports that she did not go by ambulance but she did have her son drive her to [facility].  I do have copies of her labs including CBC, CMP and troponin x2 which were all WNL.  She reports they performed another EKG but this was unremarkable and I do not have this one for review. Her EKG that was performed at urgent care is consistent with possible left ventricular hypertrophy, however, and I do think this warrants consultation and evaluation by cardiology.  Patient is agreeable to this, and referral was placed today.   - REFERRAL TO CARDIOLOGY    2. Primary hypertension  Chronic condition, stable.  Patient's BP is 130/70 in the office today.  She takes lisinopril 10 mg daily and tolerates this well.  She states that she does take her BP at " home and it is usually in this range or under.  Continue current regimen.  - lisinopril (PRINIVIL) 10 MG Tab; Take 1 Tablet by mouth every day.  Dispense: 30 Tablet; Refill: 3    3. Lichen sclerosus et atrophicus of the vulva  Chronic condition, stable.  Patient does well with clobestasol ointment.  She does need a refill today.   Continue current regimen.    - clobetasol (TEMOVATE) 0.05 % Ointment; Apply 1 Application topically 2 times a day.  Dispense: 60 g; Refill: 2    4. Screening for endocrine disorder  5. Screening for cardiovascular condition  6. Encounter for screening mammogram for malignant neoplasm of breast  - TSH WITH REFLEX TO FT4; Future  - HEMOGLOBIN A1C; Future  - Lipid Profile; Future  - MA-SCREENING MAMMO BILAT W/TOMOSYNTHESIS W/CAD; Future      Return in about 6 months (around 7/3/2022).    I have placed the below orders and discussed them with an approved delegating provider.  The MA is performing the below orders under the direction of Azul Ibrahim MD.    Please note that this dictation was created using voice recognition software. I have made every reasonable attempt to correct obvious errors, but I expect that there are errors of grammar and possibly content that I did not discover before finalizing the note.

## 2022-01-31 ENCOUNTER — TELEPHONE (OUTPATIENT)
Dept: CARDIOLOGY | Facility: MEDICAL CENTER | Age: 57
End: 2022-01-31

## 2022-01-31 NOTE — TELEPHONE ENCOUNTER
Spoke to patient in regards to records for NP appointment with DARÍO on 02/8/2022 at 9:20pm. Patient has never been treated by a cardiologist, all recent records in epic including blood work, cardiac testing, and EKG. Confirmed appt date, time and location.     Pt stated she was sent to [facility] ED after going to [place] urgent care on 12/27/2021. Records are being requested for any cardiac records.     Pending cardiac records from [facility].

## 2022-02-08 ENCOUNTER — OFFICE VISIT (OUTPATIENT)
Dept: CARDIOLOGY | Facility: MEDICAL CENTER | Age: 57
End: 2022-02-08
Attending: NURSE PRACTITIONER
Payer: COMMERCIAL

## 2022-02-08 ENCOUNTER — NON-PROVIDER VISIT (OUTPATIENT)
Dept: CARDIOLOGY | Facility: MEDICAL CENTER | Age: 57
End: 2022-02-08
Attending: INTERNAL MEDICINE
Payer: COMMERCIAL

## 2022-02-08 VITALS
WEIGHT: 222.8 LBS | OXYGEN SATURATION: 95 % | SYSTOLIC BLOOD PRESSURE: 152 MMHG | HEART RATE: 82 BPM | BODY MASS INDEX: 34.97 KG/M2 | DIASTOLIC BLOOD PRESSURE: 92 MMHG | HEIGHT: 67 IN | RESPIRATION RATE: 14 BRPM

## 2022-02-08 DIAGNOSIS — R07.89 OTHER CHEST PAIN: ICD-10-CM

## 2022-02-08 DIAGNOSIS — I47.10 SVT (SUPRAVENTRICULAR TACHYCARDIA) (HCC): ICD-10-CM

## 2022-02-08 DIAGNOSIS — I10 PRIMARY HYPERTENSION: ICD-10-CM

## 2022-02-08 DIAGNOSIS — R00.2 PALPITATIONS: ICD-10-CM

## 2022-02-08 DIAGNOSIS — I49.3 PVCS (PREMATURE VENTRICULAR CONTRACTIONS): ICD-10-CM

## 2022-02-08 DIAGNOSIS — I49.1 PREMATURE ATRIAL CONTRACTIONS: ICD-10-CM

## 2022-02-08 DIAGNOSIS — R94.31 NONSPECIFIC ABNORMAL ELECTROCARDIOGRAM (ECG) (EKG): ICD-10-CM

## 2022-02-08 PROCEDURE — 99204 OFFICE O/P NEW MOD 45 MIN: CPT | Performed by: INTERNAL MEDICINE

## 2022-02-08 PROCEDURE — 93000 ELECTROCARDIOGRAM COMPLETE: CPT | Performed by: INTERNAL MEDICINE

## 2022-02-08 RX ORDER — LISINOPRIL 20 MG/1
20 TABLET ORAL DAILY
Qty: 30 TABLET | Refills: 11 | Status: SHIPPED | OUTPATIENT
Start: 2022-02-08 | End: 2022-04-20

## 2022-02-08 ASSESSMENT — ENCOUNTER SYMPTOMS
HEARTBURN: 1
CHILLS: 0
FEVER: 0
MUSCULOSKELETAL NEGATIVE: 1
HEADACHES: 0
DOUBLE VISION: 0
PALPITATIONS: 1
ABDOMINAL PAIN: 0
MYALGIAS: 0
WEAKNESS: 0
BRUISES/BLEEDS EASILY: 1
FOCAL WEAKNESS: 0
NEUROLOGICAL NEGATIVE: 1
NERVOUS/ANXIOUS: 0
RESPIRATORY NEGATIVE: 1
DIZZINESS: 0
DEPRESSION: 0
SHORTNESS OF BREATH: 0
BLURRED VISION: 0
COUGH: 0
VOMITING: 0
CLAUDICATION: 0
PSYCHIATRIC NEGATIVE: 1
EYES NEGATIVE: 1
WEIGHT LOSS: 0
NAUSEA: 0

## 2022-02-08 NOTE — PROGRESS NOTES
Chief Complaint   Patient presents with   • Palpitations       Subjective     Rosita Chu is a 56 y.o. female who presents today as a consult from Kathya Azar for palpitations.    Thank you for allowing me to evaluate Mrs. Chu, who as you know is a 56 year old female with history of hypertension, no family history of coronary artery disease. She was well until 6 months ago when she began to experience palpitations. She describes her palpitations to be mild palpitation sensations lasting up seconds to couple of hours occurring couple times per week. She admits to unassociated chest pain described to be pressure sensation. She denies associated shortness of breath, diaphoresis, nausea and vomiting. Her palpitations are aggravated by emotional stress and linda alleviating factors. She was evaluated at [facility] on 12/27/21 for chest pain with hypertension. She is under significant stress currently due to loss of her  to suicide, her son with drug dependence and her daughter undergoing difficult divorce and custody.    Past Medical History:   Diagnosis Date   • Allergic rhinitis 10/6/2014   • Anxiety    • ASTHMA    • Depression    • Hypertension    • Palpitations      Past Surgical History:   Procedure Laterality Date   • LUMBAR LAMINECTOMY DISKECTOMY  2005    L4-5   • HYSTEROSCOPY THERMAL ABLATION     • TONSILLECTOMY      17 years old     Family History   Problem Relation Age of Onset   • Hypertension Mother    • Stroke Father    • Alcohol/Drug Sister    • Alcohol/Drug Sister    • Alcohol/Drug Brother    • Heart Disease Neg Hx      Social History     Socioeconomic History   • Marital status:      Spouse name: Not on file   • Number of children: Not on file   • Years of education: Not on file   • Highest education level: Not on file   Occupational History   • Not on file   Tobacco Use   • Smoking status: Never Smoker   • Smokeless tobacco:  Never Used   Vaping Use   • Vaping Use: Never used   Substance and Sexual Activity   • Alcohol use: Yes     Alcohol/week: 1.2 oz     Types: 2 Glasses of wine per week     Comment: month   • Drug use: No   • Sexual activity: Not Currently     Partners: 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: , 3 children, Manicurist   Other Topics Concern   • Not on file   Social History Narrative   • Not on file     Social Determinants of Health     Financial Resource Strain:    • Difficulty of Paying Living Expenses: Not on file   Food Insecurity:    • Worried About Running Out of Food in the Last Year: Not on file   • Ran Out of Food in the Last Year: Not on file   Transportation Needs:    • Lack of Transportation (Medical): Not on file   • Lack of Transportation (Non-Medical): Not on file   Physical Activity:    • Days of Exercise per Week: Not on file   • Minutes of Exercise per Session: Not on file   Stress:    • Feeling of Stress : Not on file   Social Connections:    • Frequency of Communication with Friends and Family: Not on file   • Frequency of Social Gatherings with Friends and Family: Not on file   • Attends Mu-ism Services: Not on file   • Active Member of Clubs or Organizations: Not on file   • Attends Club or Organization Meetings: Not on file   • Marital Status: Not on file   Intimate Partner Violence:    • Fear of Current or Ex-Partner: Not on file   • Emotionally Abused: Not on file   • Physically Abused: Not on file   • Sexually Abused: Not on file   Housing Stability:    • Unable to Pay for Housing in the Last Year: Not on file   • Number of Places Lived in the Last Year: Not on file   • Unstable Housing in the Last Year: Not on file     Allergies   Allergen Reactions   • Ees [Erythromycin] Hives   • Pcn [Penicillins]      (Medications reviewed.)  Outpatient Encounter Medications as of 2/8/2022   Medication Sig Dispense Refill   • Omega-3 Fatty Acids (FISH OIL PO) Take  by mouth.     • VITAMIN D PO Take  by mouth.     • B  "Complex Vitamins (VITAMIN B COMPLEX PO) Take  by mouth.     • aspirin EC (ECOTRIN) 81 MG Tablet Delayed Response Take 81 mg by mouth every day.     • lisinopril (PRINIVIL) 10 MG Tab Take 1 Tablet by mouth every day. 30 Tablet 3   • clobetasol (TEMOVATE) 0.05 % Ointment Apply 1 Application topically 2 times a day. 60 g 2     No facility-administered encounter medications on file as of 2/8/2022.     Review of Systems   Constitutional: Positive for malaise/fatigue. Negative for chills, fever and weight loss.   HENT: Negative.  Negative for hearing loss.    Eyes: Negative.  Negative for blurred vision and double vision.   Respiratory: Negative.  Negative for cough and shortness of breath.    Cardiovascular: Positive for palpitations. Negative for chest pain, claudication and leg swelling.   Gastrointestinal: Positive for heartburn. Negative for abdominal pain, nausea and vomiting.   Genitourinary: Negative.  Negative for dysuria and urgency.   Musculoskeletal: Negative.  Negative for joint pain and myalgias.   Skin: Positive for rash. Negative for itching.   Neurological: Negative.  Negative for dizziness, focal weakness, weakness and headaches.   Endo/Heme/Allergies: Positive for environmental allergies. Bruises/bleeds easily.   Psychiatric/Behavioral: Negative.  Negative for depression. The patient is not nervous/anxious.               Objective     /92 (BP Location: Left arm, Patient Position: Sitting, BP Cuff Size: Adult)   Pulse 82   Resp 14   Ht 1.702 m (5' 7\")   Wt 101 kg (222 lb 12.8 oz)   SpO2 95%   BMI 34.90 kg/m²     Physical Exam  Constitutional:       Appearance: She is well-developed.   HENT:      Head: Normocephalic and atraumatic.   Neck:      Vascular: No JVD.   Cardiovascular:      Rate and Rhythm: Normal rate and regular rhythm.      Heart sounds: Normal heart sounds.   Pulmonary:      Effort: Pulmonary effort is normal.      Breath sounds: Normal breath sounds.   Abdominal:      General: " Bowel sounds are normal.      Palpations: Abdomen is soft.      Comments: No hepatosplenomegaly.   Musculoskeletal:         General: Normal range of motion.   Lymphadenopathy:      Cervical: No cervical adenopathy.   Skin:     General: Skin is warm and dry.   Neurological:      Mental Status: She is alert and oriented to person, place, and time.            CARDIAC STUDIES/PROCEDURES:    EKG was ordered for palpitations, performed on (02/08/22) was reviewed: EKG, personally interpreted shows sinus rhythm with incomplete left bundle branch block.    Assessment & Plan     1. Palpitations  EKG   2. Other chest pain     3. Nonspecific abnormal electrocardiogram (ECG) (EKG)     4. Primary hypertension         Medical Decision Making: Today's Assessment/Status/Plan:        1. Palpitations: She is experiencing palpitations as described above. We will perform a Zio Patch monitor and follow up with her.  2. Chest pain: She is experiencing chest pain and her EKG was abnormal as described above. We will perform a myocardial perfusion imaging study and an echocardiogram and follow up with her.   3. Hypertension: Blood pressure has been high. We will increase lisinopril to 20 mg QD.    We will follow up after her tests to reassess her symptoms.    Thank you for this consult.

## 2022-02-08 NOTE — PROGRESS NOTES
Home enrollment completed in the 14 day Zio XT Holter monitoring program, per Filiberto Garcia M.D.  Monitor to be shipped to patient via [employer].   >Currently pending EOS.

## 2022-02-10 ENCOUNTER — HOSPITAL ENCOUNTER (OUTPATIENT)
Dept: RADIOLOGY | Facility: MEDICAL CENTER | Age: 57
End: 2022-02-10
Attending: INTERNAL MEDICINE
Payer: COMMERCIAL

## 2022-02-10 ENCOUNTER — TELEPHONE (OUTPATIENT)
Dept: CARDIOLOGY | Facility: MEDICAL CENTER | Age: 57
End: 2022-02-10

## 2022-02-10 DIAGNOSIS — R07.89 OTHER CHEST PAIN: ICD-10-CM

## 2022-02-10 LAB — EKG IMPRESSION: NORMAL

## 2022-02-10 PROCEDURE — 700111 HCHG RX REV CODE 636 W/ 250 OVERRIDE (IP)

## 2022-02-10 PROCEDURE — 78452 HT MUSCLE IMAGE SPECT MULT: CPT | Mod: 26 | Performed by: INTERNAL MEDICINE

## 2022-02-10 PROCEDURE — A9502 TC99M TETROFOSMIN: HCPCS

## 2022-02-10 PROCEDURE — 93018 CV STRESS TEST I&R ONLY: CPT | Performed by: INTERNAL MEDICINE

## 2022-02-10 RX ORDER — AMINOPHYLLINE 25 MG/ML
100 INJECTION, SOLUTION INTRAVENOUS
Status: DISCONTINUED | OUTPATIENT
Start: 2022-02-10 | End: 2022-02-11 | Stop reason: HOSPADM

## 2022-02-10 RX ORDER — REGADENOSON 0.08 MG/ML
INJECTION, SOLUTION INTRAVENOUS
Status: COMPLETED
Start: 2022-02-10 | End: 2022-02-10

## 2022-02-10 RX ORDER — REGADENOSON 0.08 MG/ML
0.4 INJECTION, SOLUTION INTRAVENOUS ONCE
Status: COMPLETED | OUTPATIENT
Start: 2022-02-10 | End: 2022-02-10

## 2022-02-10 RX ADMIN — REGADENOSON 0.4 MG: 0.08 INJECTION, SOLUTION INTRAVENOUS at 09:22

## 2022-02-21 ENCOUNTER — OFFICE VISIT (OUTPATIENT)
Dept: URGENT CARE | Facility: PHYSICIAN GROUP | Age: 57
End: 2022-02-21
Payer: COMMERCIAL

## 2022-02-21 VITALS
DIASTOLIC BLOOD PRESSURE: 82 MMHG | WEIGHT: 220 LBS | TEMPERATURE: 98.1 F | HEIGHT: 67 IN | BODY MASS INDEX: 34.53 KG/M2 | HEART RATE: 72 BPM | SYSTOLIC BLOOD PRESSURE: 138 MMHG | OXYGEN SATURATION: 98 % | RESPIRATION RATE: 18 BRPM

## 2022-02-21 DIAGNOSIS — K64.4 EXTERNAL HEMORRHOIDS: ICD-10-CM

## 2022-02-21 PROCEDURE — 99213 OFFICE O/P EST LOW 20 MIN: CPT | Performed by: PHYSICIAN ASSISTANT

## 2022-02-21 RX ORDER — COCOA BUTTER, PHENYLEPHRINE HYDROCHLORIDE 2211; 6.25 MG/1; MG/1
1 SUPPOSITORY RECTAL EVERY 6 HOURS PRN
COMMUNITY
End: 2022-12-02

## 2022-02-21 RX ORDER — NITROGLYCERIN 4 MG/G
OINTMENT RECTAL
Qty: 30 G | Refills: 0 | Status: SHIPPED | OUTPATIENT
Start: 2022-02-21 | End: 2022-12-02

## 2022-02-21 ASSESSMENT — ENCOUNTER SYMPTOMS
HEADACHES: 0
NAUSEA: 0
DIZZINESS: 0
ABDOMINAL PAIN: 0
DIARRHEA: 0
VOMITING: 0
BLOOD IN STOOL: 1
CONSTIPATION: 0
CHILLS: 0
FEVER: 0

## 2022-02-21 NOTE — PROGRESS NOTES
Subjective:   Rosita Chu is a 56 y.o. female who presents for Hemorrhoids (2 weeks ago started, last 5 days much worse)      HPI  56 y.o. female presents to urgent care with new problem to provider of 2-week history of rectal pain and bleeding worse with bowel movements over the last 2 weeks. History of hemorrhoids. Patient has been trialing over-the-counter suppositories, Anusol cream, and sitz bath's with some relief. She reports bright red blood with bowel movements and wiping. No abdominal pain, vomiting, or fevers. Denies other associated aggravating or alleviating factors.     Review of Systems   Constitutional: Negative for chills and fever.   Gastrointestinal: Positive for blood in stool. Negative for abdominal pain, constipation, diarrhea, melena, nausea and vomiting.   Genitourinary:        Rectal pain/bleeding   Neurological: Negative for dizziness and headaches.       Patient Active Problem List   Diagnosis   • Anxiety   • Depression   • ASTHMA   • Lichen sclerosus et atrophicus of the vulva   • Allergic rhinitis   • Obesity (BMI 30.0-34.9)   • Abnormal mammogram of both breasts   • Acne   • Well woman exam with routine gynecological exam   • Toenail fungus   • AK (actinic keratosis)   • Palpitations   • Primary hypertension   • Other chest pain   • Nonspecific abnormal electrocardiogram (ECG) (EKG)     Past Surgical History:   Procedure Laterality Date   • LUMBAR LAMINECTOMY DISKECTOMY  2005    L4-5   • HYSTEROSCOPY THERMAL ABLATION     • TONSILLECTOMY      17 years old     Social History     Tobacco Use   • Smoking status: Never Smoker   • Smokeless tobacco: Never Used   Vaping Use   • Vaping Use: Never used   Substance Use Topics   • Alcohol use: Yes     Alcohol/week: 1.2 oz     Types: 2 Glasses of wine per week     Comment: month   • Drug use: No      Family History   Problem Relation Age of Onset   • Hypertension Mother    • Stroke Father    • Alcohol/Drug Sister    • Alcohol/Drug Sister   "  • Alcohol/Drug Brother    • Heart Disease Neg Hx       (Allergies, Medications, & Tobacco/Substance Use were reconciled by the Medical Assistant and reviewed by myself. The family history is prepopulated)     Objective:     /82   Pulse 72   Temp 36.7 °C (98.1 °F)   Resp 18   Ht 1.702 m (5' 7\")   Wt 99.8 kg (220 lb)   SpO2 98%   BMI 34.46 kg/m²     Physical Exam  Vitals reviewed.   Constitutional:       Appearance: Normal appearance.   HENT:      Head: Normocephalic and atraumatic.   Cardiovascular:      Rate and Rhythm: Normal rate.   Pulmonary:      Effort: Pulmonary effort is normal. No respiratory distress.   Genitourinary:     Rectum: Tenderness and external hemorrhoid present. No anal fissure.       Musculoskeletal:      Cervical back: Normal range of motion.   Skin:     General: Skin is warm.   Neurological:      Mental Status: She is alert and oriented to person, place, and time.   Psychiatric:         Mood and Affect: Mood normal.         Behavior: Behavior normal.         Thought Content: Thought content normal.         Judgment: Judgment normal.         Assessment/Plan:     1. External hemorrhoids  Nitroglycerin 0.4 % Ointment    Referral to Gastroenterology     Continue with OTC suppositories, Anusol cream, and sitz bath's. Patient may also alternate Tylenol and ibuprofen for pain. Continue to monitor for worsening symptoms of blood in stool or passing of clots with increased pain. Recommend follow-up with GI. Patient reports history of normal colonoscopy at age 50.  Discussed risks of nitroglycerin ointment, patient should discontinue use for development of headaches, near syncopal episodes, or other symptoms of decreased blood pressure.  Differential diagnosis, natural history, supportive care, and indications for immediate follow-up discussed.    Advised the patient to follow-up with the primary care physician for recheck, reevaluation, and consideration of further management.  Patient " verbalized understanding of treatment plan and has no further questions regarding care.     I personally reviewed prior external notes and test results pertinent to today's visit.    Please note that this dictation was created using voice recognition software. I have made a reasonable attempt to correct obvious errors, but I expect that there are errors of grammar and possibly content that I did not discover before finalizing the note.    This note was electronically signed by Valerie Galan PA-C

## 2022-02-22 ENCOUNTER — TELEPHONE (OUTPATIENT)
Dept: MEDICAL GROUP | Facility: PHYSICIAN GROUP | Age: 57
End: 2022-02-22
Payer: COMMERCIAL

## 2022-02-22 DIAGNOSIS — K64.4 EXTERNAL HEMORRHOIDS: ICD-10-CM

## 2022-02-22 RX ORDER — BENZOCAINE/MENTHOL 6 MG-10 MG
1 LOZENGE MUCOUS MEMBRANE 2 TIMES DAILY
Qty: 1.5 G | Refills: 0 | Status: SHIPPED | OUTPATIENT
Start: 2022-02-22 | End: 2022-12-02

## 2022-02-23 NOTE — TELEPHONE ENCOUNTER
Pt would like to know if she should d/c the ointment that the UC prescribed to her or should she use both? Please advise.

## 2022-02-23 NOTE — TELEPHONE ENCOUNTER
1. Caller Name: Rosita                          Call Back Number: [phone] (home)       Pt called stating that she has a severe case of hemorrhoids and she went to UC but her pharmacy was out of the ointment that the UC provider prescribed to her.   Pt states she can barely have a bowel movement and would like to know if you can prescribe her a steroid and/or something to help ease the px?    Please advise.

## 2022-03-07 ENCOUNTER — TELEPHONE (OUTPATIENT)
Dept: CARDIOLOGY | Facility: MEDICAL CENTER | Age: 57
End: 2022-03-07
Payer: COMMERCIAL

## 2022-03-08 PROCEDURE — 93248 EXT ECG>7D<15D REV&INTERPJ: CPT | Performed by: INTERNAL MEDICINE

## 2022-03-14 ENCOUNTER — HOSPITAL ENCOUNTER (OUTPATIENT)
Dept: RADIOLOGY | Facility: MEDICAL CENTER | Age: 57
End: 2022-03-14
Attending: NURSE PRACTITIONER
Payer: COMMERCIAL

## 2022-03-14 DIAGNOSIS — Z12.31 ENCOUNTER FOR SCREENING MAMMOGRAM FOR MALIGNANT NEOPLASM OF BREAST: ICD-10-CM

## 2022-03-14 PROCEDURE — 77063 BREAST TOMOSYNTHESIS BI: CPT

## 2022-04-20 ENCOUNTER — TELEPHONE (OUTPATIENT)
Dept: CARDIOLOGY | Facility: MEDICAL CENTER | Age: 57
End: 2022-04-20
Payer: COMMERCIAL

## 2022-04-20 ENCOUNTER — HOSPITAL ENCOUNTER (OUTPATIENT)
Dept: CARDIOLOGY | Facility: MEDICAL CENTER | Age: 57
End: 2022-04-20
Attending: INTERNAL MEDICINE
Payer: COMMERCIAL

## 2022-04-20 DIAGNOSIS — I10 PRIMARY HYPERTENSION: ICD-10-CM

## 2022-04-20 DIAGNOSIS — R94.31 NONSPECIFIC ABNORMAL ELECTROCARDIOGRAM (ECG) (EKG): ICD-10-CM

## 2022-04-20 DIAGNOSIS — R00.2 PALPITATIONS: ICD-10-CM

## 2022-04-20 DIAGNOSIS — R07.89 OTHER CHEST PAIN: ICD-10-CM

## 2022-04-20 LAB
LV EJECT FRACT  99904: 60
LV EJECT FRACT MOD 2C 99903: 59.08
LV EJECT FRACT MOD 4C 99902: 69.39
LV EJECT FRACT MOD BP 99901: 62.8

## 2022-04-20 PROCEDURE — 93306 TTE W/DOPPLER COMPLETE: CPT

## 2022-04-20 PROCEDURE — 93306 TTE W/DOPPLER COMPLETE: CPT | Mod: 26 | Performed by: INTERNAL MEDICINE

## 2022-04-20 RX ORDER — LOSARTAN POTASSIUM 50 MG/1
50 TABLET ORAL EVERY EVENING
Qty: 90 TABLET | Refills: 3 | Status: SHIPPED | OUTPATIENT
Start: 2022-04-20 | End: 2022-05-09 | Stop reason: SDUPTHER

## 2022-04-20 NOTE — TELEPHONE ENCOUNTER
Switch to losartan 50 mg QPM please. Watch cough if not improved within 2 weeks of stopping lisinopril. If cough still persists, can try something else. SC

## 2022-04-20 NOTE — TELEPHONE ENCOUNTER
JI    Patient called to advise the Rx they were prescribed lisinopril (PRINIVIL) 20 MG Tab, is giving the Pt bad coughing episodes to the point where the Pt was eating and coughed, chocked on their food. They would like another RX. Pt can be reached at [phone].    Thank you,  Marleen MANN

## 2022-04-20 NOTE — TELEPHONE ENCOUNTER
"Spoke to patient over phone. Patient states earlier in the year she likely had Covid. Since that time she has had an ongoing cough that has gotten worse since starting Lisinopril, specifically with increase in dose. Per patient, cough is \"nagging\" and is keeping her awake at night. On two occasions she has choked on her food due to coughing. Patient denies all other symptoms. She would like to d/c Lisinopril and try something else.     Patient states she monitors BP once per week. It has been ranging 125-130/72-85. Advised to monitor BP daily, 2 hours after taking morning medications for the most accurate reading. Patient verbalizes understanding.           To JI: Please advise  "

## 2022-04-20 NOTE — TELEPHONE ENCOUNTER
RX sent to preferred pharmacy. Spoke to patient over phone, update given. Patient verbalizes understanding and will reach out in 2 weeks with update on symptoms and BP readings.

## 2022-05-09 ENCOUNTER — OFFICE VISIT (OUTPATIENT)
Dept: CARDIOLOGY | Facility: MEDICAL CENTER | Age: 57
End: 2022-05-09
Payer: COMMERCIAL

## 2022-05-09 VITALS
HEART RATE: 78 BPM | DIASTOLIC BLOOD PRESSURE: 84 MMHG | SYSTOLIC BLOOD PRESSURE: 130 MMHG | HEIGHT: 67 IN | RESPIRATION RATE: 16 BRPM | BODY MASS INDEX: 34.84 KG/M2 | OXYGEN SATURATION: 92 % | WEIGHT: 222 LBS

## 2022-05-09 DIAGNOSIS — I47.10 SVT (SUPRAVENTRICULAR TACHYCARDIA) (HCC): ICD-10-CM

## 2022-05-09 DIAGNOSIS — I10 PRIMARY HYPERTENSION: ICD-10-CM

## 2022-05-09 DIAGNOSIS — R00.2 PALPITATIONS: ICD-10-CM

## 2022-05-09 PROBLEM — R94.31 NONSPECIFIC ABNORMAL ELECTROCARDIOGRAM (ECG) (EKG): Status: RESOLVED | Noted: 2022-02-08 | Resolved: 2022-05-09

## 2022-05-09 PROCEDURE — 99214 OFFICE O/P EST MOD 30 MIN: CPT | Performed by: INTERNAL MEDICINE

## 2022-05-09 RX ORDER — LOSARTAN POTASSIUM 50 MG/1
50 TABLET ORAL EVERY EVENING
Qty: 90 TABLET | Refills: 3 | Status: SHIPPED | OUTPATIENT
Start: 2022-05-09 | End: 2023-06-23

## 2022-05-09 ASSESSMENT — ENCOUNTER SYMPTOMS
FOCAL WEAKNESS: 0
BLURRED VISION: 0
VOMITING: 0
FEVER: 0
NERVOUS/ANXIOUS: 0
NAUSEA: 0
HEADACHES: 0
DEPRESSION: 0
PSYCHIATRIC NEGATIVE: 1
PALPITATIONS: 1
WEAKNESS: 0
CONSTITUTIONAL NEGATIVE: 1
CHILLS: 0
ABDOMINAL PAIN: 0
DIZZINESS: 0
GASTROINTESTINAL NEGATIVE: 1
COUGH: 1
NEUROLOGICAL NEGATIVE: 1
CLAUDICATION: 0
BRUISES/BLEEDS EASILY: 0
EYES NEGATIVE: 1
MUSCULOSKELETAL NEGATIVE: 1
MYALGIAS: 0
WEIGHT LOSS: 0
DOUBLE VISION: 0
SHORTNESS OF BREATH: 0

## 2022-05-09 NOTE — PROGRESS NOTES
Chief Complaint   Patient presents with   • Palpitations       Subjective     Rosita Chu is a 57 y.o. female who presents today for follow up of palpitations, chest pain, study result review, medication change evaluation, hypertension and blood pressure assessment.    Since the patient's last visit on 22, she has been doing well clinically. She admits to resolution of her chest pain with blood pressure control, resolution of her palpitations and minimal cough. She denies shortness of breath, nausea/vomiting or diaphoresis. On the last visit her lisinopril dose was increased, however, she could not tolerate the higher dose due to cough and was canged to losartan. She is tired as she is helping her daughter just underwent successful .     Past Medical History:   Diagnosis Date   • Allergic rhinitis 10/6/2014   • Anxiety    • ASTHMA    • Depression    • Hypertension    • Palpitations      Past Surgical History:   Procedure Laterality Date   • LUMBAR LAMINECTOMY DISKECTOMY      L4-5   • HYSTEROSCOPY THERMAL ABLATION     • TONSILLECTOMY      17 years old     Family History   Problem Relation Age of Onset   • Hypertension Mother    • Stroke Father    • Alcohol/Drug Sister    • Alcohol/Drug Sister    • Alcohol/Drug Brother    • Heart Disease Neg Hx      Social History     Socioeconomic History   • Marital status:      Spouse name: Not on file   • Number of children: Not on file   • Years of education: Not on file   • Highest education level: Not on file   Occupational History   • Not on file   Tobacco Use   • Smoking status: Never Smoker   • Smokeless tobacco: Never Used   Vaping Use   • Vaping Use: Never used   Substance and Sexual Activity   • Alcohol use: Not Currently     Alcohol/week: 1.2 oz     Types: 2 Glasses of wine per week     Comment: month   • Drug use: No   • Sexual activity: Not Currently     Partners: 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: , 3 children, Manicurist   Other Topics  Concern   • Not on file   Social History Narrative   • Not on file     Social Determinants of Health     Financial Resource Strain: Not on file   Food Insecurity: Not on file   Transportation Needs: Not on file   Physical Activity: Not on file   Stress: Not on file   Social Connections: Not on file   Intimate Partner Violence: Not on file   Housing Stability: Not on file     Allergies   Allergen Reactions   • Ees [Erythromycin] Hives   • Pcn [Penicillins]      (Medications reviewed.)  Outpatient Encounter Medications as of 5/9/2022   Medication Sig Dispense Refill   • losartan (COZAAR) 50 MG Tab Take 1 Tablet by mouth every evening. 90 Tablet 3   • hydrocortisone 1 % Cream Apply 1 Application topically 2 times a day. 1.5 g 0   • phenylephrine-cocoa butter (PREPARATION H) 0.25-88.44 % Suppos suppository Insert 1 Suppository into the rectum every 6 hours as needed.     • Nitroglycerin 0.4 % Ointment Apply small amount to rectal area once daily 30 g 0   • Omega-3 Fatty Acids (FISH OIL PO) Take  by mouth.     • VITAMIN D PO Take  by mouth.     • B Complex Vitamins (VITAMIN B COMPLEX PO) Take  by mouth.     • aspirin EC (ECOTRIN) 81 MG Tablet Delayed Response Take 81 mg by mouth every day.     • clobetasol (TEMOVATE) 0.05 % Ointment Apply 1 Application topically 2 times a day. 60 g 2     No facility-administered encounter medications on file as of 5/9/2022.     Review of Systems   Constitutional: Negative.  Negative for chills, fever, malaise/fatigue and weight loss.   HENT: Negative.  Negative for hearing loss.    Eyes: Negative.  Negative for blurred vision and double vision.   Respiratory: Positive for cough. Negative for shortness of breath.    Cardiovascular: Positive for palpitations. Negative for chest pain, claudication and leg swelling.   Gastrointestinal: Negative.  Negative for abdominal pain, nausea and vomiting.   Genitourinary: Negative.  Negative for dysuria and urgency.   Musculoskeletal: Negative.   "Negative for joint pain and myalgias.   Skin: Negative.  Negative for itching and rash.   Neurological: Negative.  Negative for dizziness, focal weakness, weakness and headaches.   Endo/Heme/Allergies: Negative.  Does not bruise/bleed easily.   Psychiatric/Behavioral: Negative.  Negative for depression. The patient is not nervous/anxious.               Objective     /84 (BP Location: Left arm, Patient Position: Sitting, BP Cuff Size: Adult)   Pulse 78   Resp 16   Ht 1.702 m (5' 7\")   Wt 101 kg (222 lb)   SpO2 92%   BMI 34.77 kg/m²     Physical Exam  Constitutional:       Appearance: She is well-developed.   HENT:      Head: Normocephalic and atraumatic.   Neck:      Vascular: No JVD.   Cardiovascular:      Rate and Rhythm: Normal rate and regular rhythm.      Heart sounds: Normal heart sounds.   Pulmonary:      Effort: Pulmonary effort is normal.      Breath sounds: Normal breath sounds.   Abdominal:      General: Bowel sounds are normal.      Palpations: Abdomen is soft.      Comments: No hepatosplenomegaly.   Musculoskeletal:         General: Normal range of motion.   Lymphadenopathy:      Cervical: No cervical adenopathy.   Skin:     General: Skin is warm and dry.   Neurological:      Mental Status: She is alert and oriented to person, place, and time.            CARDIAC STUDIES/PROCEDURES:    ECHOCARDIOGRAM CONCLUSIONS (04/20/22)  No prior study is available for comparison.   Normal left ventricular systolic function.  The left ventricular ejection fraction is visually estimated to be 60%.  No significant valvular disease.   (study result reviewed)     EKG performed on (02/08/22) EKG shows sinus rhythm with incomplete left bundle branch block.    MYOCARDIAL PERFUSION STUDY CONCLUSIONS (02/10/22)  NUCLEAR IMAGING INTERPRETATION  Normal Lexiscan myocardial perfusion study.   No evidence of ischemia or infarct.   SDS 0.  LVEF 68%.  Rest EKG Sinus rhythm with incomplete left bundle branch block.  No " ischemic changes with Regadenoson.  No arrhythmias.  No chest pain.  ECG INTERPRETATION  Negative stress ECG for ischemia.  (study result reviewed)     ZIO PATCH REPORT (02/12/22-02/26/22)   Zio Patch study showing predominately sinus rhythm with maximum rate of 146 bpm, minimum rate of 53 bpm, average of 79 bpm.   Atrial fibrillation: None.   Supraventricular tachycardia: 5 episodes of supraventricular tachycardia with fastest interval lasting 6 beats with a max rate of 146 bpm with longest lasting 20 seconds with average rate of 115 bpm noted.   Pauses: None.   Heart block: None.   Ventricular tachycardia: None.   <1% burden of premature ventricular contractions and <1% burden of premature atrial contractions.     Assessment & Plan     1. Palpitations     2. SVT (supraventricular tachycardia) ([facility])     3. Primary hypertension         Medical Decision Making: Today's Assessment/Status/Plan:        1. Palpitations with supraventricular tachycardia: She is doing well without recurrence of her palpitations.  2. Hypertension: Blood pressure is well controlled. We will continue with losartan.  3. Chest pain: Her chest pain has resolved and her cardiac studies were unremarkable. No further studies recommended at this time.  4. Medication intolerance: She is intolerant to higher dose of lisinopril due to cough.  5. Health maintenance: She is under significant stress currently due to loss of her  to suicide, her son with drug dependence and her daughter undergoing difficult divorce and custody.    We will follow up the patient in one year.    DARREN Arroyo MananKathya

## 2022-05-31 NOTE — PROGRESS NOTES
I have reviewed and agree with history, assessment and plan for office encounter dated 1/3/2022 with PATRICK Monae.  Face to face encounter/direct observation: No  Suggestions as follows: No changes to assessment or plan  -Azul Ibrahim MD

## 2022-06-29 ENCOUNTER — HOSPITAL ENCOUNTER (OUTPATIENT)
Dept: LAB | Facility: MEDICAL CENTER | Age: 57
End: 2022-06-29
Attending: NURSE PRACTITIONER
Payer: COMMERCIAL

## 2022-06-29 DIAGNOSIS — Z13.6 SCREENING FOR CARDIOVASCULAR CONDITION: ICD-10-CM

## 2022-06-29 DIAGNOSIS — Z13.29 SCREENING FOR ENDOCRINE DISORDER: ICD-10-CM

## 2022-06-29 LAB
CHOLEST SERPL-MCNC: 260 MG/DL (ref 100–199)
EST. AVERAGE GLUCOSE BLD GHB EST-MCNC: 123 MG/DL
FASTING STATUS PATIENT QL REPORTED: NORMAL
HBA1C MFR BLD: 5.9 % (ref 4–5.6)
HDLC SERPL-MCNC: 49 MG/DL
LDLC SERPL CALC-MCNC: 168 MG/DL
TRIGL SERPL-MCNC: 213 MG/DL (ref 0–149)
TSH SERPL DL<=0.005 MIU/L-ACNC: 1.38 UIU/ML (ref 0.38–5.33)

## 2022-06-29 PROCEDURE — 36415 COLL VENOUS BLD VENIPUNCTURE: CPT

## 2022-06-29 PROCEDURE — 84443 ASSAY THYROID STIM HORMONE: CPT

## 2022-06-29 PROCEDURE — 83036 HEMOGLOBIN GLYCOSYLATED A1C: CPT

## 2022-06-29 PROCEDURE — 80061 LIPID PANEL: CPT

## 2022-09-19 ENCOUNTER — OFFICE VISIT (OUTPATIENT)
Dept: MEDICAL GROUP | Facility: PHYSICIAN GROUP | Age: 57
End: 2022-09-19
Payer: COMMERCIAL

## 2022-09-19 VITALS
WEIGHT: 221.2 LBS | HEART RATE: 72 BPM | HEIGHT: 67 IN | DIASTOLIC BLOOD PRESSURE: 70 MMHG | TEMPERATURE: 98.8 F | RESPIRATION RATE: 16 BRPM | OXYGEN SATURATION: 93 % | SYSTOLIC BLOOD PRESSURE: 116 MMHG | BODY MASS INDEX: 34.72 KG/M2

## 2022-09-19 DIAGNOSIS — R10.13 EPIGASTRIC PAIN: ICD-10-CM

## 2022-09-19 PROBLEM — R10.9 ABDOMINAL PAIN: Status: ACTIVE | Noted: 2022-09-19

## 2022-09-19 PROCEDURE — 99214 OFFICE O/P EST MOD 30 MIN: CPT | Performed by: PHYSICIAN ASSISTANT

## 2022-09-19 RX ORDER — OMEPRAZOLE 20 MG/1
20 CAPSULE, DELAYED RELEASE ORAL DAILY
Qty: 30 CAPSULE | Refills: 3 | Status: SHIPPED | OUTPATIENT
Start: 2022-09-19 | End: 2022-11-16

## 2022-09-19 NOTE — PROGRESS NOTES
Subjective:     CC: abdominal pain    HPI:   Rosita presents today with     Abdominal pain  Patient reports abdominal pain intermittently for the last month a half. Has had 3 episodes of epigastric pain that radiates towards her back. Also feeling like she has a lot of acid in her stomach.   Happened once after eating custard and other times after other foods. Stool has also been lighter. Also associated with occasional nausea and vomiting with the episodes.     Past Medical History:   Diagnosis Date    Allergic rhinitis 10/6/2014    Anxiety     ASTHMA     Depression     Hypertension     Palpitations        Social History     Tobacco Use    Smoking status: Never    Smokeless tobacco: Never   Vaping Use    Vaping Use: Never used   Substance Use Topics    Alcohol use: Not Currently     Alcohol/week: 1.2 oz     Types: 2 Glasses of wine per week     Comment: month    Drug use: No       Current Outpatient Medications Ordered in Epic   Medication Sig Dispense Refill    omeprazole (PRILOSEC) 20 MG delayed-release capsule Take 1 Capsule by mouth every day. 30 Capsule 3    losartan (COZAAR) 50 MG Tab Take 1 Tablet by mouth every evening. 90 Tablet 3    hydrocortisone 1 % Cream Apply 1 Application topically 2 times a day. 1.5 g 0    Omega-3 Fatty Acids (FISH OIL PO) Take  by mouth.      VITAMIN D PO Take  by mouth.      B Complex Vitamins (VITAMIN B COMPLEX PO) Take  by mouth.      clobetasol (TEMOVATE) 0.05 % Ointment Apply 1 Application topically 2 times a day. 60 g 2    phenylephrine-cocoa butter (PREPARATION H) 0.25-88.44 % Suppos suppository Insert 1 Suppository into the rectum every 6 hours as needed. (Patient not taking: Reported on 9/19/2022)      Nitroglycerin 0.4 % Ointment Apply small amount to rectal area once daily (Patient not taking: Reported on 9/19/2022) 30 g 0    aspirin EC (ECOTRIN) 81 MG Tablet Delayed Response Take 81 mg by mouth every day. (Patient not taking: Reported on 9/19/2022)       No current  "Epic-ordered facility-administered medications on file.       Allergies:  Ees [erythromycin] and Pcn [penicillins]    Health Maintenance: Completed    ROS:  Gen: no fevers/chills  Eyes: no changes in vision  ENT: no sore throat  Pulm: no sob, no cough  CV: no chest pain  GI: no nausea/vomiting  : no dysuria  MSk: no myalgias  Skin: no rash  Neuro: no headaches  Psych: no depression, no anxiety      Objective:       Exam:  /70 (BP Location: Right arm, Patient Position: Sitting, BP Cuff Size: Adult long)   Pulse 72   Temp 37.1 °C (98.8 °F) (Temporal)   Resp 16   Ht 1.702 m (5' 7\")   Wt 100 kg (221 lb 3.2 oz)   SpO2 93%   BMI 34.64 kg/m²  Body mass index is 34.64 kg/m².    Gen: Alert and oriented, No apparent distress.  Skin: Warm, dry, good turgor, no rashes in visible areas.  HEENT: Normocephalic. Eyes conjunctiva clear lids without ptosis, pupils equal and reactive to light accommodation, ears normal shape and contour, canals are clear bilaterally, tympanic membranes are benign, nasal mucosa benign, oropharynx is without erythema, edema or exudates.   Neck: Trachea midline, no masses, no thyromegaly  Lungs: Normal effort, CTA bilaterally, no wheezes, rhonchi, or rales  CV: Regular rate and rhythm. No murmurs, rubs, or gallops.  MSK: Normal gait, moves all extremities.  Neuro: Grossly non-focal.  Ext: No clubbing, cyanosis, edema.  Psych: Alert and oriented x3, normal affect and mood.     Labs: Labs from 6/29/2022 were reviewed and discussed with the patient. All questions were answered.     Assessment & Plan:     57 y.o. female with the following -     1. Epigastric pain  Suspect uncontrolled GERD as well as possible gallbladder disease. Plan to treat with omeprazole and get an US and blood work to further evaluate (pt just had some blood work completed. Gerd precautions given including avoiding the supine position after eating, avoid tight fitting clothing, head of bed elevation by raising legs of " bed,  avoid eating prior to sleeping, avoid reflux-inducing foods. Patient denies sore throat or difficulty swallowing. Will also trial omeprazole as needed. Follow up for new or worsening symptoms or if not improving. Referral to GI declined today. Will follow up with results or sooner for new or worsening symptoms. Will place referral to gen surgery if indicated on imaging.   - omeprazole (PRILOSEC) 20 MG delayed-release capsule; Take 1 Capsule by mouth every day.  Dispense: 30 Capsule; Refill: 3  - US-RUQ; Future  - Comp Metabolic Panel; Future  - AMYLASE; Future  - LIPASE; Future    I spent a total of 31 minutes with record review (including external notes and labs), exam, communication with the patient, communication with other providers, and documentation of this encounter.     Return for follow up labs/imaging.    Please note that this dictation was created using voice recognition software. I have made every reasonable attempt to correct obvious errors, but I expect that there are errors of grammar and possibly content that I did not discover before finalizing the note.    Electronically signed by Cassandra Lemos PA-C on September 19, 2022

## 2022-09-19 NOTE — ASSESSMENT & PLAN NOTE
Patient reports abdominal pain intermittently for the last month a half. Has had 3 episodes of epigastric pain that radiates towards her back. Also feeling like she has a lot of acid in her stomach.   Happened once after eating custard and other times after other foods. Stool has also been lighter. Also associated with occasional nausea and vomiting with the episodes.

## 2022-09-20 ENCOUNTER — HOSPITAL ENCOUNTER (OUTPATIENT)
Dept: LAB | Facility: MEDICAL CENTER | Age: 57
End: 2022-09-20
Attending: PHYSICIAN ASSISTANT
Payer: COMMERCIAL

## 2022-09-20 DIAGNOSIS — R10.13 EPIGASTRIC PAIN: ICD-10-CM

## 2022-09-20 LAB
ALBUMIN SERPL BCP-MCNC: 4 G/DL (ref 3.2–4.9)
ALBUMIN/GLOB SERPL: 1.3 G/DL
ALP SERPL-CCNC: 61 U/L (ref 30–99)
ALT SERPL-CCNC: 34 U/L (ref 2–50)
AMYLASE SERPL-CCNC: 51 U/L (ref 20–103)
ANION GAP SERPL CALC-SCNC: 13 MMOL/L (ref 7–16)
AST SERPL-CCNC: 17 U/L (ref 12–45)
BILIRUB SERPL-MCNC: 0.6 MG/DL (ref 0.1–1.5)
BUN SERPL-MCNC: 19 MG/DL (ref 8–22)
CALCIUM SERPL-MCNC: 9.4 MG/DL (ref 8.5–10.5)
CHLORIDE SERPL-SCNC: 104 MMOL/L (ref 96–112)
CO2 SERPL-SCNC: 22 MMOL/L (ref 20–33)
CREAT SERPL-MCNC: 0.66 MG/DL (ref 0.5–1.4)
FASTING STATUS PATIENT QL REPORTED: NORMAL
GFR SERPLBLD CREATININE-BSD FMLA CKD-EPI: 102 ML/MIN/1.73 M 2
GLOBULIN SER CALC-MCNC: 3.1 G/DL (ref 1.9–3.5)
GLUCOSE SERPL-MCNC: 101 MG/DL (ref 65–99)
LIPASE SERPL-CCNC: 42 U/L (ref 11–82)
POTASSIUM SERPL-SCNC: 4.3 MMOL/L (ref 3.6–5.5)
PROT SERPL-MCNC: 7.1 G/DL (ref 6–8.2)
SODIUM SERPL-SCNC: 139 MMOL/L (ref 135–145)

## 2022-09-20 PROCEDURE — 82150 ASSAY OF AMYLASE: CPT

## 2022-09-20 PROCEDURE — 83690 ASSAY OF LIPASE: CPT

## 2022-09-20 PROCEDURE — 80053 COMPREHEN METABOLIC PANEL: CPT

## 2022-09-20 PROCEDURE — 36415 COLL VENOUS BLD VENIPUNCTURE: CPT

## 2022-09-22 ENCOUNTER — HOSPITAL ENCOUNTER (OUTPATIENT)
Dept: RADIOLOGY | Facility: MEDICAL CENTER | Age: 57
End: 2022-09-22
Attending: PHYSICIAN ASSISTANT
Payer: COMMERCIAL

## 2022-09-22 DIAGNOSIS — R10.13 EPIGASTRIC PAIN: ICD-10-CM

## 2022-09-22 PROCEDURE — 76705 ECHO EXAM OF ABDOMEN: CPT

## 2022-09-26 DIAGNOSIS — K80.80 BILIARY CALCULUS OF OTHER SITE WITHOUT OBSTRUCTION: ICD-10-CM

## 2022-11-14 DIAGNOSIS — R10.13 EPIGASTRIC PAIN: ICD-10-CM

## 2022-11-16 RX ORDER — OMEPRAZOLE 20 MG/1
20 CAPSULE, DELAYED RELEASE ORAL DAILY
Qty: 90 CAPSULE | Refills: 0 | Status: SHIPPED | OUTPATIENT
Start: 2022-11-16 | End: 2022-12-23

## 2022-12-02 ENCOUNTER — PRE-ADMISSION TESTING (OUTPATIENT)
Dept: ADMISSIONS | Facility: MEDICAL CENTER | Age: 57
End: 2022-12-02
Attending: SURGERY
Payer: COMMERCIAL

## 2022-12-02 DIAGNOSIS — Z01.812 PRE-OPERATIVE LABORATORY EXAMINATION: ICD-10-CM

## 2022-12-02 LAB
ANION GAP SERPL CALC-SCNC: 11 MMOL/L (ref 7–16)
BUN SERPL-MCNC: 14 MG/DL (ref 8–22)
CALCIUM SERPL-MCNC: 10.3 MG/DL (ref 8.5–10.5)
CHLORIDE SERPL-SCNC: 105 MMOL/L (ref 96–112)
CO2 SERPL-SCNC: 26 MMOL/L (ref 20–33)
CREAT SERPL-MCNC: 0.51 MG/DL (ref 0.5–1.4)
ERYTHROCYTE [DISTWIDTH] IN BLOOD BY AUTOMATED COUNT: 43.9 FL (ref 35.9–50)
GFR SERPLBLD CREATININE-BSD FMLA CKD-EPI: 108 ML/MIN/1.73 M 2
GLUCOSE SERPL-MCNC: 92 MG/DL (ref 65–99)
HCT VFR BLD AUTO: 47.7 % (ref 37–47)
HGB BLD-MCNC: 15.6 G/DL (ref 12–16)
MCH RBC QN AUTO: 27.7 PG (ref 27–33)
MCHC RBC AUTO-ENTMCNC: 32.7 G/DL (ref 33.6–35)
MCV RBC AUTO: 84.6 FL (ref 81.4–97.8)
PLATELET # BLD AUTO: 437 K/UL (ref 164–446)
PMV BLD AUTO: 11 FL (ref 9–12.9)
POTASSIUM SERPL-SCNC: 4.2 MMOL/L (ref 3.6–5.5)
RBC # BLD AUTO: 5.64 M/UL (ref 4.2–5.4)
SODIUM SERPL-SCNC: 142 MMOL/L (ref 135–145)
WBC # BLD AUTO: 9.6 K/UL (ref 4.8–10.8)

## 2022-12-02 PROCEDURE — 80048 BASIC METABOLIC PNL TOTAL CA: CPT

## 2022-12-02 PROCEDURE — 85027 COMPLETE CBC AUTOMATED: CPT

## 2022-12-02 PROCEDURE — 36415 COLL VENOUS BLD VENIPUNCTURE: CPT

## 2022-12-14 ENCOUNTER — ANESTHESIA EVENT (OUTPATIENT)
Dept: SURGERY | Facility: MEDICAL CENTER | Age: 57
End: 2022-12-14
Payer: COMMERCIAL

## 2022-12-15 ENCOUNTER — ANESTHESIA (OUTPATIENT)
Dept: SURGERY | Facility: MEDICAL CENTER | Age: 57
End: 2022-12-15
Payer: COMMERCIAL

## 2022-12-15 ENCOUNTER — HOSPITAL ENCOUNTER (OUTPATIENT)
Facility: MEDICAL CENTER | Age: 57
End: 2022-12-15
Attending: SURGERY | Admitting: SURGERY
Payer: COMMERCIAL

## 2022-12-15 VITALS
OXYGEN SATURATION: 92 % | WEIGHT: 210.98 LBS | SYSTOLIC BLOOD PRESSURE: 118 MMHG | DIASTOLIC BLOOD PRESSURE: 52 MMHG | HEIGHT: 67 IN | TEMPERATURE: 97.2 F | BODY MASS INDEX: 33.11 KG/M2 | HEART RATE: 67 BPM | RESPIRATION RATE: 16 BRPM

## 2022-12-15 DIAGNOSIS — G89.18 POSTOPERATIVE PAIN: ICD-10-CM

## 2022-12-15 LAB — PATHOLOGY CONSULT NOTE: NORMAL

## 2022-12-15 PROCEDURE — 160009 HCHG ANES TIME/MIN: Performed by: SURGERY

## 2022-12-15 PROCEDURE — 88304 TISSUE EXAM BY PATHOLOGIST: CPT

## 2022-12-15 PROCEDURE — 700111 HCHG RX REV CODE 636 W/ 250 OVERRIDE (IP): Performed by: ANESTHESIOLOGY

## 2022-12-15 PROCEDURE — 160046 HCHG PACU - 1ST 60 MINS PHASE II: Performed by: SURGERY

## 2022-12-15 PROCEDURE — 160002 HCHG RECOVERY MINUTES (STAT): Performed by: SURGERY

## 2022-12-15 PROCEDURE — 160048 HCHG OR STATISTICAL LEVEL 1-5: Performed by: SURGERY

## 2022-12-15 PROCEDURE — 700105 HCHG RX REV CODE 258: Performed by: SURGERY

## 2022-12-15 PROCEDURE — 700101 HCHG RX REV CODE 250: Performed by: ANESTHESIOLOGY

## 2022-12-15 PROCEDURE — 160035 HCHG PACU - 1ST 60 MINS PHASE I: Performed by: SURGERY

## 2022-12-15 PROCEDURE — 700102 HCHG RX REV CODE 250 W/ 637 OVERRIDE(OP): Performed by: ANESTHESIOLOGY

## 2022-12-15 PROCEDURE — 160025 RECOVERY II MINUTES (STATS): Performed by: SURGERY

## 2022-12-15 PROCEDURE — 160041 HCHG SURGERY MINUTES - EA ADDL 1 MIN LEVEL 4: Performed by: SURGERY

## 2022-12-15 PROCEDURE — 160036 HCHG PACU - EA ADDL 30 MINS PHASE I: Performed by: SURGERY

## 2022-12-15 PROCEDURE — 160029 HCHG SURGERY MINUTES - 1ST 30 MINS LEVEL 4: Performed by: SURGERY

## 2022-12-15 PROCEDURE — 00790 ANES IPER UPR ABD NOS: CPT | Performed by: ANESTHESIOLOGY

## 2022-12-15 PROCEDURE — A9270 NON-COVERED ITEM OR SERVICE: HCPCS | Performed by: ANESTHESIOLOGY

## 2022-12-15 PROCEDURE — 700101 HCHG RX REV CODE 250: Performed by: SURGERY

## 2022-12-15 RX ORDER — MEPERIDINE HYDROCHLORIDE 25 MG/ML
12.5 INJECTION INTRAMUSCULAR; INTRAVENOUS; SUBCUTANEOUS
Status: DISCONTINUED | OUTPATIENT
Start: 2022-12-15 | End: 2022-12-15 | Stop reason: HOSPADM

## 2022-12-15 RX ORDER — ROCURONIUM BROMIDE 10 MG/ML
INJECTION, SOLUTION INTRAVENOUS PRN
Status: DISCONTINUED | OUTPATIENT
Start: 2022-12-15 | End: 2022-12-15 | Stop reason: SURG

## 2022-12-15 RX ORDER — OXYCODONE HYDROCHLORIDE 5 MG/1
5 TABLET ORAL EVERY 4 HOURS PRN
Qty: 12 TABLET | Refills: 0 | Status: SHIPPED | OUTPATIENT
Start: 2022-12-15 | End: 2022-12-18

## 2022-12-15 RX ORDER — IPRATROPIUM BROMIDE AND ALBUTEROL SULFATE 2.5; .5 MG/3ML; MG/3ML
3 SOLUTION RESPIRATORY (INHALATION)
Status: DISCONTINUED | OUTPATIENT
Start: 2022-12-15 | End: 2022-12-15 | Stop reason: HOSPADM

## 2022-12-15 RX ORDER — SODIUM CHLORIDE, SODIUM LACTATE, POTASSIUM CHLORIDE, CALCIUM CHLORIDE 600; 310; 30; 20 MG/100ML; MG/100ML; MG/100ML; MG/100ML
INJECTION, SOLUTION INTRAVENOUS CONTINUOUS
Status: ACTIVE | OUTPATIENT
Start: 2022-12-15 | End: 2022-12-15

## 2022-12-15 RX ORDER — ACETAMINOPHEN 325 MG/1
650 TABLET ORAL EVERY 6 HOURS
Qty: 60 TABLET | Refills: 0 | Status: SHIPPED | OUTPATIENT
Start: 2022-12-15 | End: 2023-10-24

## 2022-12-15 RX ORDER — HALOPERIDOL 5 MG/ML
1 INJECTION INTRAMUSCULAR
Status: DISCONTINUED | OUTPATIENT
Start: 2022-12-15 | End: 2022-12-15 | Stop reason: HOSPADM

## 2022-12-15 RX ORDER — OXYCODONE HCL 5 MG/5 ML
10 SOLUTION, ORAL ORAL
Status: COMPLETED | OUTPATIENT
Start: 2022-12-15 | End: 2022-12-15

## 2022-12-15 RX ORDER — CEFAZOLIN SODIUM 1 G/3ML
INJECTION, POWDER, FOR SOLUTION INTRAMUSCULAR; INTRAVENOUS PRN
Status: DISCONTINUED | OUTPATIENT
Start: 2022-12-15 | End: 2022-12-15 | Stop reason: SURG

## 2022-12-15 RX ORDER — POLYETHYLENE GLYCOL 3350 17 G/17G
17 POWDER, FOR SOLUTION ORAL DAILY
Qty: 20 EACH | Refills: 0 | Status: SHIPPED | OUTPATIENT
Start: 2022-12-15 | End: 2023-10-24

## 2022-12-15 RX ORDER — BUPIVACAINE HYDROCHLORIDE AND EPINEPHRINE 5; 5 MG/ML; UG/ML
INJECTION, SOLUTION EPIDURAL; INTRACAUDAL; PERINEURAL
Status: DISCONTINUED | OUTPATIENT
Start: 2022-12-15 | End: 2022-12-15 | Stop reason: HOSPADM

## 2022-12-15 RX ORDER — DEXMEDETOMIDINE HYDROCHLORIDE 100 UG/ML
INJECTION, SOLUTION INTRAVENOUS PRN
Status: DISCONTINUED | OUTPATIENT
Start: 2022-12-15 | End: 2022-12-15 | Stop reason: SURG

## 2022-12-15 RX ORDER — SODIUM CHLORIDE, SODIUM LACTATE, POTASSIUM CHLORIDE, CALCIUM CHLORIDE 600; 310; 30; 20 MG/100ML; MG/100ML; MG/100ML; MG/100ML
INJECTION, SOLUTION INTRAVENOUS CONTINUOUS
Status: DISCONTINUED | OUTPATIENT
Start: 2022-12-15 | End: 2022-12-15 | Stop reason: HOSPADM

## 2022-12-15 RX ORDER — LABETALOL HYDROCHLORIDE 5 MG/ML
5 INJECTION, SOLUTION INTRAVENOUS
Status: DISCONTINUED | OUTPATIENT
Start: 2022-12-15 | End: 2022-12-15 | Stop reason: HOSPADM

## 2022-12-15 RX ORDER — CELECOXIB 200 MG/1
400 CAPSULE ORAL ONCE
Status: COMPLETED | OUTPATIENT
Start: 2022-12-15 | End: 2022-12-15

## 2022-12-15 RX ORDER — ONDANSETRON 2 MG/ML
INJECTION INTRAMUSCULAR; INTRAVENOUS PRN
Status: DISCONTINUED | OUTPATIENT
Start: 2022-12-15 | End: 2022-12-15 | Stop reason: SURG

## 2022-12-15 RX ORDER — IBUPROFEN 600 MG/1
600 TABLET ORAL EVERY 6 HOURS
Qty: 45 TABLET | Refills: 0 | Status: SHIPPED | OUTPATIENT
Start: 2022-12-15 | End: 2023-10-24

## 2022-12-15 RX ORDER — HYDROMORPHONE HYDROCHLORIDE 1 MG/ML
0.1 INJECTION, SOLUTION INTRAMUSCULAR; INTRAVENOUS; SUBCUTANEOUS
Status: DISCONTINUED | OUTPATIENT
Start: 2022-12-15 | End: 2022-12-15 | Stop reason: HOSPADM

## 2022-12-15 RX ORDER — HYDROMORPHONE HYDROCHLORIDE 1 MG/ML
0.2 INJECTION, SOLUTION INTRAMUSCULAR; INTRAVENOUS; SUBCUTANEOUS
Status: DISCONTINUED | OUTPATIENT
Start: 2022-12-15 | End: 2022-12-15 | Stop reason: HOSPADM

## 2022-12-15 RX ORDER — ONDANSETRON 2 MG/ML
4 INJECTION INTRAMUSCULAR; INTRAVENOUS
Status: DISCONTINUED | OUTPATIENT
Start: 2022-12-15 | End: 2022-12-15 | Stop reason: HOSPADM

## 2022-12-15 RX ORDER — MIDAZOLAM HYDROCHLORIDE 1 MG/ML
INJECTION INTRAMUSCULAR; INTRAVENOUS PRN
Status: DISCONTINUED | OUTPATIENT
Start: 2022-12-15 | End: 2022-12-15 | Stop reason: SURG

## 2022-12-15 RX ORDER — DIPHENHYDRAMINE HYDROCHLORIDE 50 MG/ML
12.5 INJECTION INTRAMUSCULAR; INTRAVENOUS
Status: DISCONTINUED | OUTPATIENT
Start: 2022-12-15 | End: 2022-12-15 | Stop reason: HOSPADM

## 2022-12-15 RX ORDER — OXYCODONE HCL 5 MG/5 ML
5 SOLUTION, ORAL ORAL
Status: COMPLETED | OUTPATIENT
Start: 2022-12-15 | End: 2022-12-15

## 2022-12-15 RX ORDER — DEXAMETHASONE SODIUM PHOSPHATE 4 MG/ML
INJECTION, SOLUTION INTRA-ARTICULAR; INTRALESIONAL; INTRAMUSCULAR; INTRAVENOUS; SOFT TISSUE PRN
Status: DISCONTINUED | OUTPATIENT
Start: 2022-12-15 | End: 2022-12-15 | Stop reason: SURG

## 2022-12-15 RX ORDER — HYDRALAZINE HYDROCHLORIDE 20 MG/ML
5 INJECTION INTRAMUSCULAR; INTRAVENOUS
Status: DISCONTINUED | OUTPATIENT
Start: 2022-12-15 | End: 2022-12-15 | Stop reason: HOSPADM

## 2022-12-15 RX ORDER — HYDROMORPHONE HYDROCHLORIDE 1 MG/ML
0.4 INJECTION, SOLUTION INTRAMUSCULAR; INTRAVENOUS; SUBCUTANEOUS
Status: DISCONTINUED | OUTPATIENT
Start: 2022-12-15 | End: 2022-12-15 | Stop reason: HOSPADM

## 2022-12-15 RX ORDER — GABAPENTIN 300 MG/1
300 CAPSULE ORAL ONCE
Status: COMPLETED | OUTPATIENT
Start: 2022-12-15 | End: 2022-12-15

## 2022-12-15 RX ORDER — LIDOCAINE HYDROCHLORIDE 20 MG/ML
INJECTION, SOLUTION EPIDURAL; INFILTRATION; INTRACAUDAL; PERINEURAL PRN
Status: DISCONTINUED | OUTPATIENT
Start: 2022-12-15 | End: 2022-12-15 | Stop reason: SURG

## 2022-12-15 RX ORDER — ACETAMINOPHEN 500 MG
1000 TABLET ORAL ONCE
Status: COMPLETED | OUTPATIENT
Start: 2022-12-15 | End: 2022-12-15

## 2022-12-15 RX ADMIN — DEXMEDETOMIDINE 20 MCG: 200 INJECTION, SOLUTION INTRAVENOUS at 07:55

## 2022-12-15 RX ADMIN — ACETAMINOPHEN 1000 MG: 500 TABLET ORAL at 06:31

## 2022-12-15 RX ADMIN — CELECOXIB 400 MG: 200 CAPSULE ORAL at 06:31

## 2022-12-15 RX ADMIN — PROPOFOL 150 MG: 10 INJECTION, EMULSION INTRAVENOUS at 07:37

## 2022-12-15 RX ADMIN — DEXMEDETOMIDINE 20 MCG: 200 INJECTION, SOLUTION INTRAVENOUS at 07:44

## 2022-12-15 RX ADMIN — SUGAMMADEX 200 MG: 100 INJECTION, SOLUTION INTRAVENOUS at 08:02

## 2022-12-15 RX ADMIN — ONDANSETRON 4 MG: 2 INJECTION INTRAMUSCULAR; INTRAVENOUS at 08:07

## 2022-12-15 RX ADMIN — LIDOCAINE HYDROCHLORIDE 60 MG: 20 INJECTION, SOLUTION EPIDURAL; INFILTRATION; INTRACAUDAL at 07:37

## 2022-12-15 RX ADMIN — ROCURONIUM BROMIDE 50 MG: 10 INJECTION, SOLUTION INTRAVENOUS at 07:37

## 2022-12-15 RX ADMIN — DEXAMETHASONE SODIUM PHOSPHATE 8 MG: 4 INJECTION, SOLUTION INTRA-ARTICULAR; INTRALESIONAL; INTRAMUSCULAR; INTRAVENOUS; SOFT TISSUE at 07:40

## 2022-12-15 RX ADMIN — MIDAZOLAM HYDROCHLORIDE 2 MG: 1 INJECTION, SOLUTION INTRAMUSCULAR; INTRAVENOUS at 07:26

## 2022-12-15 RX ADMIN — OXYCODONE HYDROCHLORIDE 5 MG: 5 SOLUTION ORAL at 08:51

## 2022-12-15 RX ADMIN — FENTANYL CITRATE 50 MCG: 50 INJECTION, SOLUTION INTRAMUSCULAR; INTRAVENOUS at 08:07

## 2022-12-15 RX ADMIN — CEFAZOLIN 2 G: 330 INJECTION, POWDER, FOR SOLUTION INTRAMUSCULAR; INTRAVENOUS at 07:37

## 2022-12-15 RX ADMIN — SODIUM CHLORIDE, POTASSIUM CHLORIDE, SODIUM LACTATE AND CALCIUM CHLORIDE: 600; 310; 30; 20 INJECTION, SOLUTION INTRAVENOUS at 06:32

## 2022-12-15 RX ADMIN — FENTANYL CITRATE 50 MCG: 50 INJECTION, SOLUTION INTRAMUSCULAR; INTRAVENOUS at 07:28

## 2022-12-15 RX ADMIN — GABAPENTIN 300 MG: 300 CAPSULE ORAL at 06:31

## 2022-12-15 ASSESSMENT — FIBROSIS 4 INDEX: FIB4 SCORE: 0.38

## 2022-12-15 ASSESSMENT — PAIN DESCRIPTION - PAIN TYPE
TYPE: SURGICAL PAIN

## 2022-12-15 ASSESSMENT — PAIN SCALES - GENERAL: PAIN_LEVEL: 2

## 2022-12-15 NOTE — ANESTHESIA PREPROCEDURE EVALUATION
Case: [id number] Date/Time: 12/15/22 0715    Procedure: LAPAROSCOPIC CHOLECYSTECTOMY.    Pre-op diagnosis: cholelithiasis without obstruction    Location: [facility]  / SURGERY [facility]    Surgeons: Clarence Olvera M.D.          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    Pertinent diagnostic labs and testing reviewed    Informed Consent:    Anesthetic plan and risks discussed with patient.    Use of blood products discussed with: patient whom consented to blood products.

## 2022-12-15 NOTE — ANESTHESIA PROCEDURE NOTES
Airway    Date/Time: 12/15/2022 7:38 AM  Performed by: Ml Torre M.D.  Authorized by: Ml Torre M.D.     Location:  OR  Urgency:  Elective  Difficult Airway: No    Indications for Airway Management:  Anesthesia      Spontaneous Ventilation: absent    Sedation Level:  Deep  Preoxygenated: Yes    Patient Position:  Sniffing  Mask Difficulty Assessment:  1 - vent by mask  Final Airway Type:  Endotracheal airway  Final Endotracheal Airway:  ETT  Cuffed: Yes    Technique Used for Successful ETT Placement:  Direct laryngoscopy    Insertion Site:  Oral  Blade Type:  Bettina  Laryngoscope Blade/Videolaryngoscope Blade Size:  3  ETT Size (mm):  7.0  Measured from:  Teeth  ETT to Teeth (cm):  21  Placement Verified by: auscultation and capnometry    Cormack-Lehane Classification:  Grade I - full view of glottis  Number of Attempts at Approach:  1

## 2022-12-15 NOTE — OP REPORT
Operative Report    Date: 12/15/2022    Surgeon: Clarence Olvera M.D.     Assistant: BRANDON Huerta    Pre-operative Diagnosis: Symptomatic Cholelithiasis    Post-operative Diagnosis: same    ASA Classification: II.    Procedure: Laparoscopic cholecystectomy    Indications: This is a 57 y.o. female who presented with symptoms of symptomatic cholelithiasis here for laparoscopic cholecystectomy.      The indications for a surgical assistant in this surgery were indicated due to complexity of the procedure. Their role included aiding in incision, retraction, holding devices including camera for laparoscopic procedure, and closure of the wound.      Wound Classification: Class II, II, Clean Contaminated..    Findings: Critical view of safety obtained    Procedure in detail: The patient was seen and examined in the preoperative holding area.  The risks benefits and alternatives of the procedure were discussed with the patient who wished to proceed with the procedure as described.  The patient was transferred to the operating room placed in supine position and all pressure points were properly padded.  General endotracheal anesthesia was induced and preoperative antibiotics were given per SCIP protocol.  Patient's abdomen was prepped with ChloraPrep and draped in the normal sterile fashion.  A timeout was performed confirming correct patient, correct procedure, and that all necessary equipment was in the room.      After infiltration of local anesthetic, an incision was made in the supraumbilical position.  A combination of blunt and electrocautery dissection was used down to the fascia.  The umbilical stalk was grasped elevated and the fascia was sharply incised.  A figure-of-eight 0 vicryl suture was placed across the fascial opening.  The Solitario port was introduced and pneumoperitoneum was achieved and maintained at 15 mmHg carbon dioxide throughout the entirety of the case.   The 5 mm camera was introduced  and the abdomen was inspected and no underlying trauma was identified from abdominal entry. After infusing local anesthetic under direct visualization two 5 mm right upper quadrant and one 5 mm epigastric port were placed.    The gallbladder was identified in the right upper quadrant.  A combination of blunt and electrocautery dissection were used to dissect the overlaying tissue free from around the cystic duct and cystic artery.  Dissection was continued until the cystic duct and cystic artery free and there is nothing but liver parenchyma behind.  This confirmed the critical view of safety. The cystic duct and cystic artery were double clipped on the patient's side single clipped on the specimen side and sharply transected. The gallbladder was removed from the cystic plate using electrocautery.  I inspected the cystic plate and hemostasis was obtained. The gallbladder was placed in a 10 mm Endo Catch bag through the supraumbilical port and removed.    All ports were removed with video assist, and were hemostatic. The previously placed vicryl suture were tied. All skin sites were closed with subcuticular Monocryl and Dermabond was placed over the wounds.    The patient was awakened from general anesthetic, and was taken to the recovery room in stable condition.    Sponge and needle counts were correct at the end of the case.     Specimen: gallbladder and content for permanent pathology    EBL: 10mL    Dispo: stable, extubated, to PACU    Clarence Olvera M.D.  [facility]  [phone]

## 2022-12-15 NOTE — ANESTHESIA TIME REPORT
Anesthesia Start and Stop Event Times     Date Time Event    12/15/2022 0702 Ready for Procedure     0725 Anesthesia Start     0813 Anesthesia Stop        Responsible Staff  12/15/22    Name Role Begin End    Ml Torre M.D. Anesth 0725 0813        Overtime Reason:  no overtime (within assigned shift)    Comments:

## 2022-12-15 NOTE — ANESTHESIA POSTPROCEDURE EVALUATION
Patient: Rosita Chu    Procedure Summary     Date: 12/15/22 Room / Location: [place] [address] / SURGERY [facility]    Anesthesia Start: 0725 Anesthesia Stop: 0813    Procedure: LAPAROSCOPIC CHOLECYSTECTOMY. (Abdomen) Diagnosis: (cholelithiasis without obstruction)    Surgeons: Clarence Olvera M.D. Responsible Provider: Ml Torre M.D.    Anesthesia Type: general ASA Status: 2          Final Anesthesia Type: general  Last vitals  BP   Blood Pressure: 118/52    Temp   36.2 °C (97.2 °F)    Pulse   67   Resp   16    SpO2   92 %      Anesthesia Post Evaluation    Patient location during evaluation: PACU  Patient participation: complete - patient participated  Level of consciousness: awake and alert  Pain score: 2    Airway patency: patent  Anesthetic complications: no  Cardiovascular status: hemodynamically stable  Respiratory status: acceptable  Hydration status: euvolemic    PONV: none          There were no known notable events for this encounter.     Nurse Pain Score: 2 (NPRS)

## 2022-12-15 NOTE — PROGRESS NOTES
Med Rec Complete per patient  Allergies Reviewed with patient  No antibiotics within the last 30 days  Patient's Preferred Pharmacy:  [facility] on [address]

## 2022-12-15 NOTE — OR NURSING
Pt A&OX4. VSS. MAP > 65. Pt on room air.   Four  lap sites to abd, dermabond closure CDI. Ice pack to abd.   Pt complains of minimal pain to abd. PO pain medication administered. No complaints of nausea. Tolerated sips of water. Report called to KETURAH Coburn. Pt's daughter, Marzena, notified pt headed to Phase II.

## 2022-12-15 NOTE — OR NURSING
Pt verbalizes readiness for discharge. Pt ambulated to bathroom and voided without difficulty. Discharge instructions reviewed with pt and pt's daughter by Chelsea REBOLLAR. No further needs. Pt taken to car via wheelchair by CNA.

## 2022-12-15 NOTE — DISCHARGE INSTRUCTIONS
HOME CARE INSTRUCTIONS    ACTIVITY: Rest and take it easy for the first 24 hours.  A responsible adult is recommended to remain with you during that time.  It is normal to feel sleepy.  We encourage you to not do anything that requires balance, judgment or coordination.    FOR 24 HOURS DO NOT:  Drive, operate machinery or run household appliances.  Drink beer or alcoholic beverages.  Make important decisions or sign legal documents.    SPECIAL INSTRUCTIONS: Laparoscopic Cholecystectomy D/C instructions:    DIET: Upon discharge from the hospital you may resume your normal preoperative diet. Depending on how you are feeling and whether you have nausea or not, you may wish to stay with a bland diet for the first few days. However, you can advance this as quickly as you feel ready.    ACTIVITIES: After discharge from the hospital, you may resume full routine activities. However, there should be no heavy lifting (greater than 15 pounds) and no strenuous activities until after your follow-up visit. Otherwise, routine activities of daily living are acceptable.    DRIVING: You may drive whenever you are off pain medications and are able to perform the activities needed to drive, i.e. turning, bending, twisting, etc.    BATHING: You may get the wound wet at any time after leaving the hospital. You may shower, but do not submerge in a bath for at least a week. Dressings may come off after 48 hours.    BOWEL FUNCTION: A few patients, after this operation, will develop either frequent or loose stools after meals. This usually corrects itself after a few days, to a few weeks. If this occurs, do not worry; it is not unusual and will resolve. Much more common than loose stools, is constipation. The combination of pain medication and decreased activity level can cause constipation in otherwise normal patients. If you feel this is occurring, take a laxative (Milk of Magnesia, Ex-Lax, Senokot, etc.) until the problem has  resolved.    PAIN MEDICATION: You will be given a prescription for pain medication at discharge. Please take these as directed. It is important to remember not to take medications on an empty stomach as this may cause nausea.    CALL IF YOU HAVE: (1) Fevers to more than 1010 F, (2) Unusual chest or leg pain, (3) Drainage or fluid from incision that may be foul smelling, increased tenderness or soreness at the wound or the wound edges are no longer together, redness or swelling at the incision site. Please do not hesitate to call with any other questions.     APPOINTMENT: Contact our office at [phone] for a follow-up appointment in 1 to 2 weeks following your procedure.    If you have any additional questions, please do not hesitate to call the office.    Office address:  [address] [place], [place] [address]e [facility], [address] [place] [address]      DIET: To avoid nausea, slowly advance diet as tolerated, avoiding spicy or greasy foods for the first day.  Add more substantial food to your diet according to your physician's instructions.  Babies can be fed formula or breast milk as soon as they are hungry.  INCREASE FLUIDS AND FIBER TO AVOID CONSTIPATION.    MEDICATIONS: Resume taking daily medication.  Take prescribed pain medication with food.  If no medication is prescribed, you may take non-aspirin pain medication if needed.  PAIN MEDICATION CAN BE VERY CONSTIPATING.  Take a stool softener or laxative such as senokot, pericolace, or milk of magnesia if needed.    Prescription given for _Roxicodone, Tylenol, Ibuprofen, Miralax.  Last pain medication given at _8:51am_.    A follow-up appointment should be arranged with your doctor; call to schedule.    You should CALL YOUR PHYSICIAN if you develop:  Fever greater than 101 degrees F.  Pain not relieved by medication, or persistent nausea or vomiting.  Excessive bleeding (blood soaking through dressing) or unexpected drainage from the wound.  Extreme redness or swelling around the incision  site, drainage of pus or foul smelling drainage.  Inability to urinate or empty your bladder within 8 hours.  Problems with breathing or chest pain.    You should call 911 if you develop problems with breathing or chest pain.  If you are unable to contact your doctor or surgical center, you should go to the nearest emergency room or urgent care center.  Physician's telephone #: [phone]    MILD FLU-LIKE SYMPTOMS ARE NORMAL.  YOU MAY EXPERIENCE GENERALIZED MUSCLE ACHES, THROAT IRRITATION, HEADACHE AND/OR SOME NAUSEA.    If any questions arise, call your doctor.  If your doctor is not available, please feel free to call the Surgical Center at [phone].  The Center is open Monday through Friday from 7AM to 7PM.      A registered nurse may call you a few days after your surgery to see how you are doing after your procedure.    You may also receive a survey in the mail within the next two weeks and we ask that you take a few moments to complete the survey and return it to us.  Our goal is to provide you with very good care and we value your comments.     Depression / Suicide Risk    As you are discharged from this [facility] facility, it is important to learn how to keep safe from harming yourself.    Recognize the warning signs:  Abrupt changes in personality, positive or negative- including increase in energy   Giving away possessions  Change in eating patterns- significant weight changes-  positive or negative  Change in sleeping patterns- unable to sleep or sleeping all the time   Unwillingness or inability to communicate  Depression  Unusual sadness, discouragement and loneliness  Talk of wanting to die  Neglect of personal appearance   Rebelliousness- reckless behavior  Withdrawal from people/activities they love  Confusion- inability to concentrate     If you or a loved one observes any of these behaviors or has concerns about self-harm, here's what you can do:  Talk about it- your feelings and  reasons for harming yourself  Remove any means that you might use to hurt yourself (examples: pills, rope, extension cords, firearm)  Get professional help from the community (Mental Health, Substance Abuse, psychological counseling)  Do not be alone:Call your Safe Contact- someone whom you trust who will be there for you.  Call your local CRISIS HOTLINE 777-3888 or 665-732-9943  Call your local Children's Mobile Crisis Response Team [place] (575) 445-9188 or www.MDLIVE  Call the toll free National Suicide Prevention Hotlines   National Suicide Prevention Lifeline 752-534-BPXP (7583)  National [place] Line Network 800-SUICIDE (996-2610)    I acknowledge receipt and understanding of these Home Care instructions.

## 2022-12-22 DIAGNOSIS — R10.13 EPIGASTRIC PAIN: ICD-10-CM

## 2022-12-22 NOTE — TELEPHONE ENCOUNTER
Received request via: Pharmacy    Was the patient seen in the last year in this department? Yes    Does the patient have an active prescription (recently filled or refills available) for medication(s) requested? No    Does the patient have prison Plus and need 100 day supply (blood pressure, diabetes and cholesterol meds only)? Patient does not have SCP

## 2022-12-23 RX ORDER — OMEPRAZOLE 20 MG/1
20 CAPSULE, DELAYED RELEASE ORAL DAILY
Qty: 90 CAPSULE | Refills: 0 | Status: SHIPPED | OUTPATIENT
Start: 2022-12-23 | End: 2023-10-24

## 2023-05-16 ENCOUNTER — OFFICE VISIT (OUTPATIENT)
Dept: CARDIOLOGY | Facility: MEDICAL CENTER | Age: 58
End: 2023-05-16
Attending: INTERNAL MEDICINE
Payer: COMMERCIAL

## 2023-05-16 VITALS
RESPIRATION RATE: 16 BRPM | OXYGEN SATURATION: 95 % | HEART RATE: 77 BPM | HEIGHT: 67 IN | WEIGHT: 220 LBS | BODY MASS INDEX: 34.53 KG/M2 | DIASTOLIC BLOOD PRESSURE: 82 MMHG | SYSTOLIC BLOOD PRESSURE: 120 MMHG

## 2023-05-16 DIAGNOSIS — I10 PRIMARY HYPERTENSION: ICD-10-CM

## 2023-05-16 DIAGNOSIS — E78.5 DYSLIPIDEMIA: ICD-10-CM

## 2023-05-16 PROCEDURE — 3074F SYST BP LT 130 MM HG: CPT | Performed by: INTERNAL MEDICINE

## 2023-05-16 PROCEDURE — 99212 OFFICE O/P EST SF 10 MIN: CPT | Performed by: INTERNAL MEDICINE

## 2023-05-16 PROCEDURE — 3079F DIAST BP 80-89 MM HG: CPT | Performed by: INTERNAL MEDICINE

## 2023-05-16 PROCEDURE — 99214 OFFICE O/P EST MOD 30 MIN: CPT | Performed by: INTERNAL MEDICINE

## 2023-05-16 RX ORDER — FEXOFENADINE HCL 60 MG/1
60 TABLET, FILM COATED ORAL DAILY
COMMUNITY

## 2023-05-16 RX ORDER — FLUTICASONE PROPIONATE 50 MCG
1 SPRAY, SUSPENSION (ML) NASAL DAILY
COMMUNITY
End: 2023-10-24

## 2023-05-16 ASSESSMENT — ENCOUNTER SYMPTOMS
PSYCHIATRIC NEGATIVE: 1
HEADACHES: 0
NAUSEA: 0
PALPITATIONS: 0
FEVER: 0
BRUISES/BLEEDS EASILY: 0
BLURRED VISION: 0
CLAUDICATION: 0
WEIGHT LOSS: 0
WEAKNESS: 0
GASTROINTESTINAL NEGATIVE: 1
VOMITING: 0
DEPRESSION: 0
EYES NEGATIVE: 1
FOCAL WEAKNESS: 0
ABDOMINAL PAIN: 0
CARDIOVASCULAR NEGATIVE: 1
NERVOUS/ANXIOUS: 0
COUGH: 0
SHORTNESS OF BREATH: 0
DOUBLE VISION: 0
NEUROLOGICAL NEGATIVE: 1
CHILLS: 0
RESPIRATORY NEGATIVE: 1
CONSTITUTIONAL NEGATIVE: 1
MUSCULOSKELETAL NEGATIVE: 1
DIZZINESS: 0
MYALGIAS: 0

## 2023-05-16 ASSESSMENT — FIBROSIS 4 INDEX: FIB4 SCORE: 0.39

## 2023-05-16 NOTE — PROGRESS NOTES
Chief Complaint   Patient presents with    Palpitations    Supraventricular Tachycardia (SVT)       Subjective     Rosita Chu is a 58 y.o. female who presents today for annual follow up of hypertension, dyslipidemia.    Since the patient's last visit on 05/09/22, she has been doing well clinically. She denies chest pain, shortness of breath, palpitations, nausea/vomiting or diaphoresis. She feels better following laparoscopic cholecystectomy. She keeps active walking her dogs. She works as a manicurist.     Past Medical History:   Diagnosis Date    Allergic rhinitis 10/06/2014    Anxiety     ASTHMA     S/P working in a [employer]    Hypertension     Pain     Upper abdomen secondary to gallbladder symptoms    Palpitations      Past Surgical History:   Procedure Laterality Date    ALLIE BY LAPAROSCOPY N/A 12/15/2022    Procedure: LAPAROSCOPIC CHOLECYSTECTOMY.;  Surgeon: Clarence Olvera M.D.;  Location: SURGERY [facility];  Service: General    LUMBAR LAMINECTOMY DISKECTOMY  2005    L4-5    HYSTEROSCOPY THERMAL ABLATION      TONSILLECTOMY      17 years old     Family History   Problem Relation Age of Onset    Hypertension Mother     Stroke Father     Alcohol/Drug Sister     Alcohol/Drug Sister     Alcohol/Drug Brother     Heart Disease Neg Hx      Social History     Socioeconomic History    Marital status:      Spouse name: Not on file    Number of children: Not on file    Years of education: Not on file    Highest education level: Not on file   Occupational History    Not on file   Tobacco Use    Smoking status: Never    Smokeless tobacco: Never   Vaping Use    Vaping Use: Never used   Substance and Sexual Activity    Alcohol use: Not Currently     Alcohol/week: 1.2 oz     Types: 2 Glasses of wine per week     Comment: month    Drug use: No    Sexual activity: Not Currently     Partners: 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: , 3 children, Manicurist   Other Topics Concern    Not on file   Social History Narrative     Not on file     Social Determinants of Health     Financial Resource Strain: Not on file   Food Insecurity: Not on file   Transportation Needs: Not on file   Physical Activity: Not on file   Stress: Not on file   Social Connections: Not on file   Intimate Partner Violence: Not on file   Housing Stability: Not on file     Allergies   Allergen Reactions    Ees [Erythromycin] Hives    Pcn [Penicillins] Itching and Swelling     Throat swelling     (Medications reviewed.)  Outpatient Encounter Medications as of 5/16/2023   Medication Sig Dispense Refill    fexofenadine (ALLEGRA) 60 MG Tab Take 60 mg by mouth every day.      fluticasone (FLONASE) 50 MCG/ACT nasal spray Administer 1 Spray into affected nostril(S) every day.      acetaminophen (TYLENOL) 325 MG Tab Take 2 Tablets by mouth every 6 hours. Alternate w/ ibuprofen to take a medication every 3 hrs, take scheduled for 3 days post-op then PRN 60 Tablet 0    ibuprofen (MOTRIN) 600 MG Tab Take 1 Tablet by mouth every 6 hours. Alternate w/ tylenol to take a medication every 3 hrs, take scheduled for 3 days post-op then PRN 45 Tablet 0    polyethylene glycol/lytes (MIRALAX) 17 g Pack Take 1 Packet by mouth every day. While taking narcotics, hold if greater then 3 BMs a day 20 Each 0    losartan (COZAAR) 50 MG Tab Take 1 Tablet by mouth every evening. 90 Tablet 3    clobetasol (TEMOVATE) 0.05 % Ointment Apply 1 Application topically 2 times a day. 60 g 2    omeprazole (PRILOSEC) 20 MG delayed-release capsule TAKE 1 CAPSULE BY MOUTH EVERY DAY (Patient not taking: Reported on 5/16/2023) 90 Capsule 0     No facility-administered encounter medications on file as of 5/16/2023.     Review of Systems   Constitutional: Negative.  Negative for chills, fever, malaise/fatigue and weight loss.   HENT: Negative.  Negative for hearing loss.    Eyes: Negative.  Negative for blurred vision and double vision.   Respiratory: Negative.  Negative for cough and shortness of breath.   "  Cardiovascular: Negative.  Negative for chest pain, palpitations, claudication and leg swelling.   Gastrointestinal: Negative.  Negative for abdominal pain, nausea and vomiting.   Genitourinary: Negative.  Negative for dysuria and urgency.   Musculoskeletal: Negative.  Negative for joint pain and myalgias.   Skin: Negative.  Negative for itching and rash.   Neurological: Negative.  Negative for dizziness, focal weakness, weakness and headaches.   Endo/Heme/Allergies: Negative.  Does not bruise/bleed easily.   Psychiatric/Behavioral: Negative.  Negative for depression. The patient is not nervous/anxious.               Objective     /82 (BP Location: Left arm, Patient Position: Sitting, BP Cuff Size: Adult)   Pulse 77   Resp 16   Ht 1.702 m (5' 7\")   Wt 99.8 kg (220 lb)   SpO2 95%   BMI 34.46 kg/m²     Physical Exam  Constitutional:       Appearance: Normal appearance. She is well-developed and normal weight.   HENT:      Head: Normocephalic and atraumatic.   Neck:      Vascular: No JVD.   Cardiovascular:      Rate and Rhythm: Normal rate and regular rhythm.      Heart sounds: Normal heart sounds.   Pulmonary:      Effort: Pulmonary effort is normal.      Breath sounds: Normal breath sounds.   Abdominal:      General: Bowel sounds are normal.      Palpations: Abdomen is soft.      Comments: No hepatosplenomegaly.   Musculoskeletal:         General: Normal range of motion.   Lymphadenopathy:      Cervical: No cervical adenopathy.   Skin:     General: Skin is warm and dry.   Neurological:      Mental Status: She is alert and oriented to person, place, and time.            CARDIAC STUDIES/PROCEDURES:     ECHOCARDIOGRAM CONCLUSIONS (04/20/22)  No prior study is available for comparison.   Normal left ventricular systolic function.  The left ventricular ejection fraction is visually estimated to be 60%.  No significant valvular disease.     EKG performed on (02/08/22) EKG shows sinus rhythm with incomplete " left bundle branch block.    Laboratory results of (06/29/22) were reviewed. Cholesterol profile of 260/213/49/168 mg/dL noted.      MYOCARDIAL PERFUSION STUDY CONCLUSIONS (02/10/22)  NUCLEAR IMAGING INTERPRETATION  Normal Lexiscan myocardial perfusion study.   No evidence of ischemia or infarct.   SDS 0.  LVEF 68%.  Rest EKG Sinus rhythm with incomplete left bundle branch block.  No ischemic changes with Regadenoson.  No arrhythmias.  No chest pain.  ECG INTERPRETATION  Negative stress ECG for ischemia.     ZIO PATCH REPORT (02/12/22-02/26/22)   Zio Patch study showing predominately sinus rhythm with maximum rate of 146 bpm, minimum rate of 53 bpm, average of 79 bpm.   Atrial fibrillation: None.   Supraventricular tachycardia: 5 episodes of supraventricular tachycardia with fastest interval lasting 6 beats with a max rate of 146 bpm with longest lasting 20 seconds with average rate of 115 bpm noted.   Pauses: None.   Heart block: None.   Ventricular tachycardia: None.   <1% burden of premature ventricular contractions and <1% burden of premature atrial contractions.        Assessment & Plan     1. Primary hypertension        2. Dyslipidemia            Medical Decision Making: Today's Assessment/Status/Plan:        Hypertension: Blood pressure is well controlled. We will continue with losartan. She will reduce her sodium intake.  Hyperlipidemia: Currently not well controlled on strict diet and exercise therapy. We will repeat labs including fasting lipid profile. We will discuss options of CT coronary calcium study following the test.   Medication intolerance: She is intolerant to higher dose of lisinopril due to cough.  Health maintenance: She is under significant stress currently due to loss of her  to suicide, her son with drug dependence and her daughter undergoing difficult divorce and custody.    We will follow up in 3 months.

## 2023-06-23 DIAGNOSIS — R00.2 PALPITATIONS: ICD-10-CM

## 2023-06-23 RX ORDER — LOSARTAN POTASSIUM 50 MG/1
50 TABLET ORAL EVERY EVENING
Qty: 90 TABLET | Refills: 3 | Status: SHIPPED | OUTPATIENT
Start: 2023-06-23

## 2023-06-23 NOTE — TELEPHONE ENCOUNTER
Is the patient due for a refill? Yes    Was the patient seen the past year? Yes    Date of last office visit: 5/16/2023    Does the patient have an upcoming appointment?  Yes   If yes, When? 8/21/2023    Provider to refill:DARÍO    Does the patients insurance require a 100 day supply?  No

## 2023-08-21 ENCOUNTER — APPOINTMENT (OUTPATIENT)
Dept: CARDIOLOGY | Facility: MEDICAL CENTER | Age: 58
End: 2023-08-21
Attending: INTERNAL MEDICINE
Payer: COMMERCIAL

## 2023-10-03 ENCOUNTER — HOSPITAL ENCOUNTER (OUTPATIENT)
Dept: LAB | Facility: MEDICAL CENTER | Age: 58
End: 2023-10-03
Attending: INTERNAL MEDICINE
Payer: COMMERCIAL

## 2023-10-03 DIAGNOSIS — E78.5 DYSLIPIDEMIA: ICD-10-CM

## 2023-10-03 LAB
ALBUMIN SERPL BCP-MCNC: 4.7 G/DL (ref 3.2–4.9)
ALBUMIN/GLOB SERPL: 1.6 G/DL
ALP SERPL-CCNC: 59 U/L (ref 30–99)
ALT SERPL-CCNC: 32 U/L (ref 2–50)
ANION GAP SERPL CALC-SCNC: 11 MMOL/L (ref 7–16)
AST SERPL-CCNC: 28 U/L (ref 12–45)
BILIRUB SERPL-MCNC: 0.8 MG/DL (ref 0.1–1.5)
BUN SERPL-MCNC: 20 MG/DL (ref 8–22)
CALCIUM ALBUM COR SERPL-MCNC: 9.3 MG/DL (ref 8.5–10.5)
CALCIUM SERPL-MCNC: 9.9 MG/DL (ref 8.5–10.5)
CHLORIDE SERPL-SCNC: 105 MMOL/L (ref 96–112)
CHOLEST SERPL-MCNC: 253 MG/DL (ref 100–199)
CO2 SERPL-SCNC: 24 MMOL/L (ref 20–33)
CREAT SERPL-MCNC: 0.68 MG/DL (ref 0.5–1.4)
FASTING STATUS PATIENT QL REPORTED: NORMAL
GFR SERPLBLD CREATININE-BSD FMLA CKD-EPI: 101 ML/MIN/1.73 M 2
GLOBULIN SER CALC-MCNC: 2.9 G/DL (ref 1.9–3.5)
GLUCOSE SERPL-MCNC: 92 MG/DL (ref 65–99)
HDLC SERPL-MCNC: 51 MG/DL
LDLC SERPL CALC-MCNC: 174 MG/DL
POTASSIUM SERPL-SCNC: 4.4 MMOL/L (ref 3.6–5.5)
PROT SERPL-MCNC: 7.6 G/DL (ref 6–8.2)
SODIUM SERPL-SCNC: 140 MMOL/L (ref 135–145)
TRIGL SERPL-MCNC: 139 MG/DL (ref 0–149)

## 2023-10-03 PROCEDURE — 80061 LIPID PANEL: CPT

## 2023-10-03 PROCEDURE — 36415 COLL VENOUS BLD VENIPUNCTURE: CPT

## 2023-10-03 PROCEDURE — 80053 COMPREHEN METABOLIC PANEL: CPT

## 2023-10-16 ENCOUNTER — OFFICE VISIT (OUTPATIENT)
Dept: CARDIOLOGY | Facility: MEDICAL CENTER | Age: 58
End: 2023-10-16
Attending: INTERNAL MEDICINE
Payer: COMMERCIAL

## 2023-10-16 VITALS
DIASTOLIC BLOOD PRESSURE: 70 MMHG | OXYGEN SATURATION: 98 % | BODY MASS INDEX: 32.96 KG/M2 | HEART RATE: 70 BPM | RESPIRATION RATE: 16 BRPM | WEIGHT: 210 LBS | SYSTOLIC BLOOD PRESSURE: 118 MMHG | HEIGHT: 67 IN

## 2023-10-16 DIAGNOSIS — E78.5 DYSLIPIDEMIA: ICD-10-CM

## 2023-10-16 DIAGNOSIS — I10 PRIMARY HYPERTENSION: ICD-10-CM

## 2023-10-16 PROCEDURE — 3078F DIAST BP <80 MM HG: CPT | Performed by: INTERNAL MEDICINE

## 2023-10-16 PROCEDURE — 99214 OFFICE O/P EST MOD 30 MIN: CPT | Performed by: INTERNAL MEDICINE

## 2023-10-16 PROCEDURE — 3074F SYST BP LT 130 MM HG: CPT | Performed by: INTERNAL MEDICINE

## 2023-10-16 PROCEDURE — 99212 OFFICE O/P EST SF 10 MIN: CPT | Performed by: INTERNAL MEDICINE

## 2023-10-16 ASSESSMENT — ENCOUNTER SYMPTOMS
BRUISES/BLEEDS EASILY: 0
COUGH: 0
VOMITING: 0
NEUROLOGICAL NEGATIVE: 1
FOCAL WEAKNESS: 0
MUSCULOSKELETAL NEGATIVE: 1
CARDIOVASCULAR NEGATIVE: 1
PALPITATIONS: 0
EYES NEGATIVE: 1
CLAUDICATION: 0
SHORTNESS OF BREATH: 0
WEAKNESS: 0
FEVER: 0
ABDOMINAL PAIN: 0
NAUSEA: 0
GASTROINTESTINAL NEGATIVE: 1
DEPRESSION: 0
CHILLS: 0
DOUBLE VISION: 0
RESPIRATORY NEGATIVE: 1
PSYCHIATRIC NEGATIVE: 1
WEIGHT LOSS: 1
DIZZINESS: 0
MYALGIAS: 0
BLURRED VISION: 0
HEADACHES: 0
NERVOUS/ANXIOUS: 0

## 2023-10-16 ASSESSMENT — FIBROSIS 4 INDEX: FIB4 SCORE: 0.66

## 2023-10-16 NOTE — PROGRESS NOTES
Chief Complaint   Patient presents with    Hypertension    Palpitations    Dyslipidemia       Subjective     Rosita Chu is a 58 y.o. female who presents today for follow up of hypertension and hyperlipidemia.    Since the patient's last visit on 05/16.23, she has been doing well clinically. She denies chest pain, shortness of breath, palpitations, nausea/vomiting or diaphoresis. She has been working on diet, sodium reduction and has lost weight. She keeps active walking 2 times per week.     Past Medical History:   Diagnosis Date    Allergic rhinitis 10/06/2014    Anxiety     ASTHMA     S/P working in a [employer]    Hypertension     Pain     Upper abdomen secondary to gallbladder symptoms    Palpitations      Past Surgical History:   Procedure Laterality Date    ALLIE BY LAPAROSCOPY N/A 12/15/2022    Procedure: LAPAROSCOPIC CHOLECYSTECTOMY.;  Surgeon: Clarence Olvera M.D.;  Location: SURGERY [facility];  Service: General    LUMBAR LAMINECTOMY DISKECTOMY  2005    L4-5    HYSTEROSCOPY THERMAL ABLATION      TONSILLECTOMY      17 years old     Family History   Problem Relation Age of Onset    Hypertension Mother     Stroke Father     Alcohol/Drug Sister     Alcohol/Drug Sister     Alcohol/Drug Brother     Heart Disease Neg Hx      Social History     Socioeconomic History    Marital status:      Spouse name: Not on file    Number of children: Not on file    Years of education: Not on file    Highest education level: Not on file   Occupational History    Not on file   Tobacco Use    Smoking status: Never    Smokeless tobacco: Never   Vaping Use    Vaping Use: Never used   Substance and Sexual Activity    Alcohol use: Not Currently     Alcohol/week: 1.2 oz     Types: 2 Glasses of wine per week     Comment: month    Drug use: No    Sexual activity: Not Currently     Partners: 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: , 3 children, Manicurist   Other Topics Concern    Not on file   Social History Narrative    Not on file      Social Determinants of Health     Financial Resource Strain: Not on file   Food Insecurity: Not on file   Transportation Needs: Not on file   Physical Activity: Not on file   Stress: Not on file   Social Connections: Not on file   Intimate Partner Violence: Not on file   Housing Stability: Not on file     Allergies   Allergen Reactions    Ees [Erythromycin] Hives    Pcn [Penicillins] Itching and Swelling     Throat swelling     Outpatient Encounter Medications as of 10/16/2023   Medication Sig Dispense Refill    losartan (COZAAR) 50 MG Tab TAKE 1 TABLET BY MOUTH EVERY EVENING 90 Tablet 3    clobetasol (TEMOVATE) 0.05 % Ointment Apply 1 Application topically 2 times a day. 60 g 2    fexofenadine (ALLEGRA) 60 MG Tab Take 60 mg by mouth every day. (Patient not taking: Reported on 10/16/2023)      fluticasone (FLONASE) 50 MCG/ACT nasal spray Administer 1 Spray into affected nostril(S) every day. (Patient not taking: Reported on 10/16/2023)      omeprazole (PRILOSEC) 20 MG delayed-release capsule TAKE 1 CAPSULE BY MOUTH EVERY DAY (Patient not taking: Reported on 5/16/2023) 90 Capsule 0    acetaminophen (TYLENOL) 325 MG Tab Take 2 Tablets by mouth every 6 hours. Alternate w/ ibuprofen to take a medication every 3 hrs, take scheduled for 3 days post-op then PRN (Patient not taking: Reported on 10/16/2023) 60 Tablet 0    ibuprofen (MOTRIN) 600 MG Tab Take 1 Tablet by mouth every 6 hours. Alternate w/ tylenol to take a medication every 3 hrs, take scheduled for 3 days post-op then PRN (Patient not taking: Reported on 10/16/2023) 45 Tablet 0    polyethylene glycol/lytes (MIRALAX) 17 g Pack Take 1 Packet by mouth every day. While taking narcotics, hold if greater then 3 BMs a day (Patient not taking: Reported on 10/16/2023) 20 Each 0     No facility-administered encounter medications on file as of 10/16/2023.     Review of Systems   Constitutional:  Positive for weight loss. Negative for chills, fever and malaise/fatigue.  "  HENT: Negative.  Negative for hearing loss.    Eyes: Negative.  Negative for blurred vision and double vision.   Respiratory: Negative.  Negative for cough and shortness of breath.    Cardiovascular: Negative.  Negative for chest pain, palpitations, claudication and leg swelling.   Gastrointestinal: Negative.  Negative for abdominal pain, nausea and vomiting.   Genitourinary: Negative.  Negative for dysuria and urgency.   Musculoskeletal: Negative.  Negative for joint pain and myalgias.   Skin: Negative.  Negative for itching and rash.   Neurological: Negative.  Negative for dizziness, focal weakness, weakness and headaches.   Endo/Heme/Allergies: Negative.  Does not bruise/bleed easily.   Psychiatric/Behavioral: Negative.  Negative for depression. The patient is not nervous/anxious.               Objective     /70 (BP Location: Left arm, Patient Position: Sitting, BP Cuff Size: Adult)   Pulse 70   Resp 16   Ht 1.702 m (5' 7\")   Wt 95.3 kg (210 lb)   SpO2 98%   BMI 32.89 kg/m²     Physical Exam  Constitutional:       Appearance: Normal appearance. She is well-developed and normal weight.   HENT:      Head: Normocephalic and atraumatic.   Neck:      Vascular: No JVD.   Cardiovascular:      Rate and Rhythm: Normal rate and regular rhythm.      Heart sounds: Normal heart sounds.   Pulmonary:      Effort: Pulmonary effort is normal.      Breath sounds: Normal breath sounds.   Abdominal:      General: Bowel sounds are normal.      Palpations: Abdomen is soft.      Comments: No hepatosplenomegaly.   Musculoskeletal:         General: Normal range of motion.   Lymphadenopathy:      Cervical: No cervical adenopathy.   Skin:     General: Skin is warm and dry.   Neurological:      Mental Status: She is alert and oriented to person, place, and time.            CARDIAC STUDIES/PROCEDURES:     ECHOCARDIOGRAM CONCLUSIONS (04/20/22)  No prior study is available for comparison.   Normal left ventricular systolic " function.  The left ventricular ejection fraction is visually estimated to be 60%.  No significant valvular disease.      EKG performed on (02/08/22) EKG shows sinus rhythm with incomplete left bundle branch block.     Laboratory results of (10/03/23) were reviewed. Cholesterol profile of 253/139/51/174mg/dL noted.   Laboratory results of (06/29/22) Cholesterol profile of 260/213/49/168 mg/dL noted.      MYOCARDIAL PERFUSION STUDY CONCLUSIONS (02/10/22)  NUCLEAR IMAGING INTERPRETATION  Normal Lexiscan myocardial perfusion study.   No evidence of ischemia or infarct.   SDS 0.  LVEF 68%.  Rest EKG Sinus rhythm with incomplete left bundle branch block.  No ischemic changes with Regadenoson.  No arrhythmias.  No chest pain.  ECG INTERPRETATION  Negative stress ECG for ischemia.     ZIO PATCH REPORT (02/12/22-02/26/22)   Zio Patch study showing predominately sinus rhythm with maximum rate of 146 bpm, minimum rate of 53 bpm, average of 79 bpm.   Atrial fibrillation: None.   Supraventricular tachycardia: 5 episodes of supraventricular tachycardia with fastest interval lasting 6 beats with a max rate of 146 bpm with longest lasting 20 seconds with average rate of 115 bpm noted.   Pauses: None.   Heart block: None.   Ventricular tachycardia: None.   <1% burden of premature ventricular contractions and <1% burden of premature atrial contractions.        Assessment & Plan     1. Dyslipidemia        2. Primary hypertension            Medical Decision Making: Today's Assessment/Status/Plan:        Hyperlipidemia: Currently not well controlled on strict diet and exercise therapy. .We will perform CT coronary calcium study.   Hypertension: Blood pressure is well controlled. We will continue with losartan.  Medication intolerance: She is intolerant to higher dose of lisinopril due to cough.  Health maintenance: She is under significant stress currently due to loss of her  to suicide, her son with drug dependence and her  daughter undergoing difficult divorce and custody.  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    We will follow up in 3 months.     CC Santos Catherine

## 2023-10-23 ENCOUNTER — HOSPITAL ENCOUNTER (OUTPATIENT)
Dept: RADIOLOGY | Facility: MEDICAL CENTER | Age: 58
End: 2023-10-23
Attending: INTERNAL MEDICINE
Payer: COMMERCIAL

## 2023-10-23 DIAGNOSIS — E78.5 DYSLIPIDEMIA: ICD-10-CM

## 2023-10-23 PROCEDURE — 4410556 CT-CARDIAC SCORING (SELF PAY ONLY)

## 2023-10-24 ENCOUNTER — OFFICE VISIT (OUTPATIENT)
Dept: MEDICAL GROUP | Facility: PHYSICIAN GROUP | Age: 58
End: 2023-10-24
Payer: COMMERCIAL

## 2023-10-24 VITALS
HEIGHT: 67 IN | WEIGHT: 213 LBS | TEMPERATURE: 98 F | DIASTOLIC BLOOD PRESSURE: 78 MMHG | OXYGEN SATURATION: 98 % | BODY MASS INDEX: 33.43 KG/M2 | SYSTOLIC BLOOD PRESSURE: 116 MMHG | HEART RATE: 80 BPM

## 2023-10-24 DIAGNOSIS — I10 PRIMARY HYPERTENSION: ICD-10-CM

## 2023-10-24 DIAGNOSIS — F32.0 CURRENT MILD EPISODE OF MAJOR DEPRESSIVE DISORDER WITHOUT PRIOR EPISODE (HCC): ICD-10-CM

## 2023-10-24 DIAGNOSIS — N90.4 LICHEN SCLEROSUS ET ATROPHICUS OF THE VULVA: ICD-10-CM

## 2023-10-24 DIAGNOSIS — E78.5 DYSLIPIDEMIA: ICD-10-CM

## 2023-10-24 DIAGNOSIS — Z23 NEED FOR VACCINATION: ICD-10-CM

## 2023-10-24 DIAGNOSIS — F41.9 ANXIETY: ICD-10-CM

## 2023-10-24 PROBLEM — L70.9 ACNE: Status: RESOLVED | Noted: 2017-11-22 | Resolved: 2023-10-24

## 2023-10-24 PROBLEM — R00.2 PALPITATIONS: Status: RESOLVED | Noted: 2022-01-03 | Resolved: 2023-10-24

## 2023-10-24 PROBLEM — I47.10 SVT (SUPRAVENTRICULAR TACHYCARDIA) (HCC): Status: RESOLVED | Noted: 2022-05-09 | Resolved: 2023-10-24

## 2023-10-24 PROBLEM — R10.9 ABDOMINAL PAIN: Status: RESOLVED | Noted: 2022-09-19 | Resolved: 2023-10-24

## 2023-10-24 PROBLEM — Z01.419 WELL WOMAN EXAM WITH ROUTINE GYNECOLOGICAL EXAM: Status: RESOLVED | Noted: 2017-11-22 | Resolved: 2023-10-24

## 2023-10-24 PROBLEM — R07.89 OTHER CHEST PAIN: Status: RESOLVED | Noted: 2022-02-08 | Resolved: 2023-10-24

## 2023-10-24 PROBLEM — G89.18 POSTOPERATIVE PAIN: Status: RESOLVED | Noted: 2022-12-15 | Resolved: 2023-10-24

## 2023-10-24 PROBLEM — B35.1 TOENAIL FUNGUS: Status: RESOLVED | Noted: 2020-12-07 | Resolved: 2023-10-24

## 2023-10-24 PROCEDURE — 90471 IMMUNIZATION ADMIN: CPT | Performed by: STUDENT IN AN ORGANIZED HEALTH CARE EDUCATION/TRAINING PROGRAM

## 2023-10-24 PROCEDURE — 99213 OFFICE O/P EST LOW 20 MIN: CPT | Mod: 25 | Performed by: STUDENT IN AN ORGANIZED HEALTH CARE EDUCATION/TRAINING PROGRAM

## 2023-10-24 PROCEDURE — 3078F DIAST BP <80 MM HG: CPT | Performed by: STUDENT IN AN ORGANIZED HEALTH CARE EDUCATION/TRAINING PROGRAM

## 2023-10-24 PROCEDURE — 90472 IMMUNIZATION ADMIN EACH ADD: CPT | Performed by: STUDENT IN AN ORGANIZED HEALTH CARE EDUCATION/TRAINING PROGRAM

## 2023-10-24 PROCEDURE — 3074F SYST BP LT 130 MM HG: CPT | Performed by: STUDENT IN AN ORGANIZED HEALTH CARE EDUCATION/TRAINING PROGRAM

## 2023-10-24 PROCEDURE — 90746 HEPB VACCINE 3 DOSE ADULT IM: CPT | Performed by: STUDENT IN AN ORGANIZED HEALTH CARE EDUCATION/TRAINING PROGRAM

## 2023-10-24 PROCEDURE — 90686 IIV4 VACC NO PRSV 0.5 ML IM: CPT | Performed by: STUDENT IN AN ORGANIZED HEALTH CARE EDUCATION/TRAINING PROGRAM

## 2023-10-24 RX ORDER — CLOBETASOL PROPIONATE 0.5 MG/G
1 OINTMENT TOPICAL 2 TIMES DAILY
Qty: 60 G | Refills: 2 | Status: SHIPPED | OUTPATIENT
Start: 2023-10-24

## 2023-10-24 RX ORDER — MELOXICAM 15 MG/1
15 TABLET ORAL
COMMUNITY
Start: 2023-10-23 | End: 2023-12-04

## 2023-10-24 ASSESSMENT — ENCOUNTER SYMPTOMS
PALPITATIONS: 0
FEVER: 0
CHILLS: 0
SHORTNESS OF BREATH: 0

## 2023-10-24 ASSESSMENT — PATIENT HEALTH QUESTIONNAIRE - PHQ9: CLINICAL INTERPRETATION OF PHQ2 SCORE: 0

## 2023-10-24 ASSESSMENT — FIBROSIS 4 INDEX: FIB4 SCORE: 0.66

## 2023-10-24 NOTE — PROGRESS NOTES
Subjective:     CC:  Diagnoses of Anxiety, Current mild episode of major depressive disorder without prior episode (HCC), Lichen sclerosus et atrophicus of the vulva, Primary hypertension, Dyslipidemia, and Need for vaccination were pertinent to this visit.    HISTORY OF THE PRESENT ILLNESS: Patient is a 58 y.o. female. This pleasant patient is here today to establish care.    Patient has no acute questions or concerns today. Reports that a week ago she had fall and hurt her right knee, she is currently being treated for at [facility] for this.    Problem   Dyslipidemia    Had calcium scoring completed on 10/23, per cardiology will decide after results are to see if she needs to be on medication     Primary Hypertension    Chronic, stable  Medication: Losartan 50mg qd     Ak (Actinic Keratosis)    Patient scheduled with dermatology for annual skin check.     Abnormal Mammogram of Both Breasts    Patient reports having dense breast tissue  Mammogram 3/2022: Breasts are heterogeneously dense, which may obscure small masses. No gross evidence of malignancy.     Allergic Rhinitis    Chronic, stable  Medication: Allergra 60mg qd, Flonase PRN     Lichen Sclerosus Et Atrophicus of The Vulva    Chronic, with current flare  Medication: Clobestol 0.05%      Anxiety    Chronic, stable  In the process of finding a therapist.  Medication: None     Depression    Chronic, stable  Medication: None     Postoperative Pain (Resolved)   Abdominal Pain (Resolved)   SVT (supraventricular tachycardia) (Resolved)   Other Chest Pain (Resolved)   Palpitations (Resolved)   Toenail Fungus (Resolved)   Acne (Resolved)   Well Woman Exam With Routine Gynecological Exam (Resolved)   Asthma (Resolved)    History of asthma, now resolved         Health Maintenance: Completed  Patient received influenza and hep b vaccine today.    ROS:   Review of Systems   Constitutional:  Negative for chills and fever.   Respiratory:  Negative for shortness of breath.   "  Cardiovascular:  Negative for chest pain and palpitations.         Objective:       Exam: /78   Pulse 80   Temp 36.7 °C (98 °F) (Temporal)   Ht 1.702 m (5' 7\")   Wt 96.6 kg (213 lb)   SpO2 98%  Body mass index is 33.36 kg/m².    Physical Exam  Constitutional:       Appearance: Normal appearance.   HENT:      Head: Normocephalic and atraumatic.      Right Ear: Tympanic membrane normal.      Left Ear: Tympanic membrane normal.      Nose: Nose normal.      Mouth/Throat:      Pharynx: Oropharynx is clear.   Eyes:      Extraocular Movements: Extraocular movements intact.      Conjunctiva/sclera: Conjunctivae normal.      Pupils: Pupils are equal, round, and reactive to light.   Cardiovascular:      Rate and Rhythm: Normal rate and regular rhythm.      Heart sounds: Normal heart sounds.   Pulmonary:      Effort: Pulmonary effort is normal. No respiratory distress.      Breath sounds: Normal breath sounds. No stridor. No wheezing, rhonchi or rales.   Chest:      Chest wall: No tenderness.   Abdominal:      General: Abdomen is flat. Bowel sounds are normal.      Palpations: Abdomen is soft.   Musculoskeletal:         General: Normal range of motion.      Cervical back: Normal range of motion and neck supple.   Skin:     General: Skin is warm and dry.   Neurological:      General: No focal deficit present.      Mental Status: She is alert.   Psychiatric:         Mood and Affect: Mood normal.             Labs:     Assessment & Plan:   58 y.o. female with the following -    1. Anxiety  2. Current mild episode of major depressive disorder without prior episode (HCC)  Chronic, stable. Patient is currently not on medication. She is in the process of finding a therapist, patient provided with website [employer] [place].    3. Lichen sclerosus et atrophicus of the vulva  Chronic, with ongoing flare. Patient reports that she was diagnosed with lichen sclerosus several years ago by her OB. She currently uses Clobetasol " during flares and is requesting refill.  - clobetasol (TEMOVATE) 0.05 % Ointment; Apply 1 Application  topically 2 times a day.  Dispense: 60 g; Refill: 2    4. Primary hypertension  Chronic, controlled.  Blood pressure is at goal under 140/90 in the office today.  We will continue the current regimen.    5. Dyslipidemia  Chronic, not on medication. Patient is established with cardiology and recently had cardiac CT completed. She states based on her calcium score her cardiologist will decide if she needs to be on medication.    6. Need for vaccination  - INFLUENZA VACCINE QUAD INJ (PF)  - Hep B Adult 20+          Return in about 2 months (around 12/24/2023) for Annual with pap.

## 2023-10-26 ENCOUNTER — TELEPHONE (OUTPATIENT)
Dept: CARDIOLOGY | Facility: MEDICAL CENTER | Age: 58
End: 2023-10-26
Payer: COMMERCIAL

## 2023-10-26 NOTE — TELEPHONE ENCOUNTER
Called pt, [phone], to review JI recommendations.  Pt verbalizes understanding and scheduled pt for 12/4/2023.  She states no other concerns or questions at this time and is appreciative of information given.

## 2023-10-26 NOTE — TELEPHONE ENCOUNTER
----- Message from Filiberto Garcia M.D. sent at 10/25/2023  9:18 AM PDT -----  Please notify patient with positive CT coronary calcium study. Please schedule follow up to discuss further plans. Thank you.  DARÍO.    ----- Message -----  From: Kathleen Cox R.N.  Sent: 10/24/2023   2:59 PM PDT  To: Filiberto Garcia M.D.    To DARÍO, No FV scheduled, please advise plan of care based on calcium scoring results, thank you!

## 2023-12-04 ENCOUNTER — OFFICE VISIT (OUTPATIENT)
Dept: MEDICAL GROUP | Facility: PHYSICIAN GROUP | Age: 58
End: 2023-12-04
Payer: COMMERCIAL

## 2023-12-04 ENCOUNTER — TELEPHONE (OUTPATIENT)
Dept: CARDIOLOGY | Facility: MEDICAL CENTER | Age: 58
End: 2023-12-04

## 2023-12-04 VITALS
WEIGHT: 207 LBS | TEMPERATURE: 97.9 F | BODY MASS INDEX: 32.49 KG/M2 | OXYGEN SATURATION: 96 % | SYSTOLIC BLOOD PRESSURE: 114 MMHG | HEIGHT: 67 IN | DIASTOLIC BLOOD PRESSURE: 70 MMHG | HEART RATE: 64 BPM

## 2023-12-04 DIAGNOSIS — Z23 NEED FOR VACCINATION: ICD-10-CM

## 2023-12-04 DIAGNOSIS — Z00.00 WELLNESS EXAMINATION: ICD-10-CM

## 2023-12-04 DIAGNOSIS — Z12.31 ENCOUNTER FOR SCREENING MAMMOGRAM FOR MALIGNANT NEOPLASM OF BREAST: ICD-10-CM

## 2023-12-04 PROCEDURE — 99396 PREV VISIT EST AGE 40-64: CPT | Mod: 25 | Performed by: STUDENT IN AN ORGANIZED HEALTH CARE EDUCATION/TRAINING PROGRAM

## 2023-12-04 PROCEDURE — 90746 HEPB VACCINE 3 DOSE ADULT IM: CPT | Performed by: STUDENT IN AN ORGANIZED HEALTH CARE EDUCATION/TRAINING PROGRAM

## 2023-12-04 PROCEDURE — 3074F SYST BP LT 130 MM HG: CPT | Performed by: STUDENT IN AN ORGANIZED HEALTH CARE EDUCATION/TRAINING PROGRAM

## 2023-12-04 PROCEDURE — 3078F DIAST BP <80 MM HG: CPT | Performed by: STUDENT IN AN ORGANIZED HEALTH CARE EDUCATION/TRAINING PROGRAM

## 2023-12-04 PROCEDURE — 90471 IMMUNIZATION ADMIN: CPT | Performed by: STUDENT IN AN ORGANIZED HEALTH CARE EDUCATION/TRAINING PROGRAM

## 2023-12-04 ASSESSMENT — FIBROSIS 4 INDEX: FIB4 SCORE: 0.66

## 2023-12-04 NOTE — TELEPHONE ENCOUNTER
----- Message from Filiberto Garcia M.D. sent at 12/4/2023 12:39 PM PST -----  FYI-She was not scheduled with me today to discuss positive CT coronary calcium study and elevated LDL level. She did follow up with PCP, however, it does not appear to of been addressed. Please find out if she wishes to follow up with me or discuss this issue with her primary care physician. Thank you.  DARÍO.   ----- Message -----  From: Alejo, Lab  Sent: 10/3/2023   9:26 PM PST  To: Filiberto Garcia M.D.

## 2023-12-04 NOTE — PROGRESS NOTES
Subjective:     CC:   Chief Complaint   Patient presents with    Gynecologic Exam       HPI:   Rosita Chu is a 58 y.o. female who presents for annual exam. She is feeling well and denies any complaints.    Ob-Gyn/ History:    Patient has GYN provider: No  /Para:    Last Pap Smear:  Completed in 2020 with negative cytology and HPV. No history of abnormal pap smears.  Gyn Surgery:  Uterine ablation several years ago due to heavy bleeding  Current Contraceptive Method:  None. Yes currently sexually active.  Last menstrual period:  Postmenopausal.    No significant bloating/fluid retention, pelvic pain, or dyspareunia. No vaginal discharge  Post-menopausal bleeding: None  Urinary incontinence: None  Folate intake: N/A    Health Maintenance  Advanced directive: N/A  Osteoporosis Screen/ DEXA: N/A  PT for falls prevention: N/A  Cholesterol Screening: Completed in 10/2023 with elevate total cholesterol and LDL. Patient established with cardiology  Diabetes Screening: Completed in 10/2023 with normal fasting blood glucose level  Aspirin Use: N/A    Anticipatory Guidance  Diet: Patient is on a low sodium diet, eating a lot of vegetables. Trying to cut down on red meat and eating more poultry  Exercise: Patient is sedentary due to her job  Substance Abuse: N/A  Safe in relationship.   Seat belts, bike helmet, gun safety discussed.  Sun protection used.  Dental Home.    Cancer screening  Colorectal Cancer Screening: Completed in 2022 with recommendation of repeating in 10 years  Lung Cancer Screening: N/A  Cervical Cancer Screening: Will complete in 2025  Breast Cancer Screening: Will provide patient with order today      Infectious disease screening/Immunizations  --STI Screening: Declined   --Practices safe sex.  --HIV Screening: Declined  --Hepatitis C Screening: Declined  --Immunizations:    Influenza: Up-to-date   HPV:  N/A   Tetanus: Up to date   Shingles: N/A   Pneumococcal :  N/A   Hepatitis B: Receive second vaccine today  COVID-19: Patient to receive at pharmacy  Other immunizations: N/A    She  has a past medical history of Abdominal pain (9/19/2022), Acne (11/22/2017), Allergic rhinitis (10/06/2014), Anxiety, ASTHMA, ASTHMA, ASTHMA, Depression, Hypertension, Lichen sclerosus et atrophicus of the vulva, Pain, Palpitations, Palpitations (1/3/2022), Postoperative pain (12/15/2022), SVT (supraventricular tachycardia) (5/9/2022), Toenail fungus, and Toenail fungus (12/7/2020).    She has no past medical history of Acute nasopharyngitis, Anesthesia, Anginal syndrome (HCC), Arrhythmia, Arthritis, Blood clotting disorder (HCC), Bowel habit changes, Breath shortness, Bronchitis, Cancer (HCC), Carcinoma in situ of respiratory system, Cataract, Congestive heart failure (HCC), Continuous ambulatory peritoneal dialysis status (HCC), Coughing blood, Dental disorder, Diabetes (HCC), Dialysis patient (HCC), Disorder of thyroid, Emphysema of lung (HCC), Glaucoma, Heart burn, Heart murmur, Heart valve disease, Hepatitis A, Hepatitis B, Hepatitis C, Hiatus hernia syndrome, High cholesterol, Indigestion, Jaundice, Myocardial infarct (HCC), Pacemaker, Pneumonia, Pregnant, Renal disorder, Rheumatic fever, Seizure (HCC), Sleep apnea, Snoring, Stroke (HCC), Tuberculosis, Urinary bladder disorder, or Urinary incontinence.  She  has a past surgical history that includes tonsillectomy; lumbar laminectomy diskectomy (2005); hysteroscopy thermal ablation; and gonzalo by laparoscopy (N/A, 12/15/2022).    Family History   Problem Relation Age of Onset    Hypertension Mother     Alcohol/Drug Sister     Alcohol/Drug Sister     Alcohol/Drug Brother     Psychiatric Illness Son         Schizoaffective Disorder    Heart Disease Neg Hx        Social History     Socioeconomic History    Marital status:      Spouse name: Not on file    Number of children: Not on file    Years of education: Not on file    Highest  education level: Not on file   Occupational History    Not on file   Tobacco Use    Smoking status: Never    Smokeless tobacco: Never   Vaping Use    Vaping Use: Never used   Substance and Sexual Activity    Alcohol use: Not Currently     Alcohol/week: 1.2 oz     Types: 2 Glasses of wine per week     Comment: rarely    Drug use: No    Sexual activity: Not Currently     Partners: 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: , 3 children, Manicurist   Other Topics Concern    Not on file   Social History Narrative    Not on file     Social Determinants of Health     Financial Resource Strain: Not on file   Food Insecurity: Not on file   Transportation Needs: Not on file   Physical Activity: Not on file   Stress: Not on file   Social Connections: Not on file   Intimate Partner Violence: Not on file   Housing Stability: Not on file       Patient Active Problem List    Diagnosis Date Noted    Dyslipidemia 05/16/2023    Primary hypertension 01/03/2022    AK (actinic keratosis) 12/07/2020    Obesity (BMI 30.0-34.9) 11/18/2016    Abnormal mammogram of both breasts 11/18/2016    Allergic rhinitis 10/06/2014    Lichen sclerosus et atrophicus of the vulva 01/22/2012    Anxiety     Depression          Current Outpatient Medications   Medication Sig Dispense Refill    clobetasol (TEMOVATE) 0.05 % Ointment Apply 1 Application  topically 2 times a day. 60 g 2    losartan (COZAAR) 50 MG Tab TAKE 1 TABLET BY MOUTH EVERY EVENING 90 Tablet 3    fexofenadine (ALLEGRA) 60 MG Tab Take 60 mg by mouth every day.       No current facility-administered medications for this visit.     Allergies   Allergen Reactions    Ees [Erythromycin] Hives    Pcn [Penicillins] Itching and Swelling     Throat swelling       Review of Systems  Constitutional: Negative for fever, chills and malaise/fatigue.   HENT: Negative for congestion.    Eyes: Negative for pain.    Respiratory: Negative for cough and shortness of breath.  Cardiovascular: Negative for leg swelling.  "  Gastrointestinal: Negative for nausea, vomiting, abdominal pain and diarrhea.   Genitourinary: Negative for dysuria and hematuria.   Skin: Negative for rash.   Neurological: Negative for dizziness, focal weakness and headaches.   Endo/Heme/Allergies: Does not bleed easily.   Psychiatric/Behavioral: Negative for depression.  The patient is not nervous/anxious.      Objective:     /70 (BP Location: Left arm, Patient Position: Sitting, BP Cuff Size: Adult)   Pulse 64   Temp 36.6 °C (97.9 °F) (Temporal)   Ht 1.702 m (5' 7\")   Wt 93.9 kg (207 lb)   SpO2 96%   BMI 32.42 kg/m²   Body mass index is 32.42 kg/m².  Wt Readings from Last 4 Encounters:   12/04/23 93.9 kg (207 lb)   10/24/23 96.6 kg (213 lb)   10/16/23 95.3 kg (210 lb)   05/16/23 99.8 kg (220 lb)       Physical Exam:  Constitutional: Well-developed and well-nourished. Not diaphoretic. No distress.   Skin: Skin is warm and dry. No rash noted.  Head: Atraumatic without lesions.  Eyes: Conjunctivae and extraocular motions are normal. Pupils are equal, round, and reactive to light. No scleral icterus.   Ears:  External ears unremarkable. Tympanic membranes clear and intact.  Nose: Nares patent. Septum midline. Turbinates without erythema nor edema. No discharge.   Mouth/Throat: Dentition is good. Tongue normal. Oropharynx is clear and moist. Posterior pharynx without erythema or exudates.  Neck: Supple, trachea midline. Normal range of motion. No thyromegaly present. No lymphadenopathy--cervical or supraclavicular.  Cardiovascular: Regular rate and rhythm, S1 and S2 without murmur, rubs, or gallops.  Lungs: Normal inspiratory effort, CTA bilaterally, no wheezes/rhonchi/rales  Breast: Deferred  Abdomen: Soft, non tender, and without distention. Active bowel sounds in all four quadrants. No rebound, guarding, masses or HSM.  : Deferred  Extremities: No cyanosis, clubbing, erythema, nor edema. Distal pulses intact and symmetric.   Musculoskeletal: All " major joints AROM full in all directions without pain.  Neurological: Alert and oriented x 3. DTRs 2+/3 and symmetric. No cranial nerve deficit. 5/5 myotomes. Sensation intact.   Psychiatric:  Behavior, mood, and affect are appropriate.        Assessment and Plan:     1. Wellness examination  Patient presents with annual wellness.    2. Encounter for screening mammogram for malignant neoplasm of breast  - MA-SCREENING MAMMO BILAT W/TOMOSYNTHESIS W/CAD; Future    3. Need for vaccination  - Hep B Adult 20+        HCM:  Up-to-date   Labs per orders  Immunizations per orders  Patient counseled about skin care, diet, supplements, prenatal vitamins, safe sex and exercise.      Follow-up: Return in about 1 year (around 12/4/2024) for Annual.

## 2023-12-05 NOTE — TELEPHONE ENCOUNTER
Upon chart review, pt cancelled 12/4 appt as she was out of town.  Upon chart review, [employer] active.  Last login 11/19/2023.    [employer] message sent to pt regarding JI recommendations.    Awaiting pt response.

## 2024-01-08 ENCOUNTER — HOSPITAL ENCOUNTER (OUTPATIENT)
Dept: RADIOLOGY | Facility: MEDICAL CENTER | Age: 59
End: 2024-01-08
Attending: STUDENT IN AN ORGANIZED HEALTH CARE EDUCATION/TRAINING PROGRAM
Payer: COMMERCIAL

## 2024-01-08 DIAGNOSIS — Z12.31 ENCOUNTER FOR SCREENING MAMMOGRAM FOR MALIGNANT NEOPLASM OF BREAST: ICD-10-CM

## 2024-01-08 PROCEDURE — 77067 SCR MAMMO BI INCL CAD: CPT
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
## 2024-04-29 ENCOUNTER — NON-PROVIDER VISIT (OUTPATIENT)
Dept: MEDICAL GROUP | Facility: PHYSICIAN GROUP | Age: 59
End: 2024-04-29
Payer: COMMERCIAL

## 2024-04-29 DIAGNOSIS — Z23 NEED FOR VACCINATION: ICD-10-CM

## 2024-04-29 PROCEDURE — 90746 HEPB VACCINE 3 DOSE ADULT IM: CPT | Performed by: STUDENT IN AN ORGANIZED HEALTH CARE EDUCATION/TRAINING PROGRAM

## 2024-04-29 PROCEDURE — 90471 IMMUNIZATION ADMIN: CPT | Performed by: STUDENT IN AN ORGANIZED HEALTH CARE EDUCATION/TRAINING PROGRAM

## 2024-07-04 DIAGNOSIS — R00.2 PALPITATIONS: ICD-10-CM

## 2024-07-05 RX ORDER — LOSARTAN POTASSIUM 50 MG/1
50 TABLET ORAL EVERY EVENING
Qty: 90 TABLET | Refills: 0 | Status: SHIPPED | OUTPATIENT
Start: 2024-07-05

## 2024-08-05 ENCOUNTER — NON-PROVIDER VISIT (OUTPATIENT)
Dept: MEDICAL GROUP | Facility: PHYSICIAN GROUP | Age: 59
End: 2024-08-05
Payer: COMMERCIAL

## 2024-08-05 DIAGNOSIS — Z23 NEED FOR VACCINATION: ICD-10-CM

## 2024-08-05 NOTE — PROGRESS NOTES
"Rosita Chu is a 59 y.o. female here for a non-provider visit for:   HEPATITIS B 2 of 3    Reason for immunization: continue or complete series started at the office  Immunization records indicate need for vaccine: Yes, confirmed with Epic  Minimum interval has been met for this vaccine: Yes  ABN completed: No    VIS Dated  4/29/24 was given to patient: Yes  All IAC Questionnaire questions were answered \"No.\"    Patient tolerated injection and no adverse effects were observed or reported: Yes    Pt scheduled for next dose in series: Yes   "

## 2024-10-02 DIAGNOSIS — R00.2 PALPITATIONS: ICD-10-CM

## 2024-10-02 RX ORDER — LOSARTAN POTASSIUM 50 MG/1
50 TABLET ORAL EVERY EVENING
Qty: 90 TABLET | Refills: 0 | Status: SHIPPED | OUTPATIENT
Start: 2024-10-02

## 2024-12-09 ENCOUNTER — APPOINTMENT (OUTPATIENT)
Dept: MEDICAL GROUP | Facility: PHYSICIAN GROUP | Age: 59
End: 2024-12-09
Payer: COMMERCIAL

## 2024-12-09 VITALS
HEIGHT: 67 IN | WEIGHT: 218.2 LBS | DIASTOLIC BLOOD PRESSURE: 90 MMHG | HEART RATE: 74 BPM | SYSTOLIC BLOOD PRESSURE: 148 MMHG | OXYGEN SATURATION: 99 % | TEMPERATURE: 98.6 F | BODY MASS INDEX: 34.25 KG/M2

## 2024-12-09 DIAGNOSIS — Z00.00 WELLNESS EXAMINATION: ICD-10-CM

## 2024-12-09 DIAGNOSIS — Z23 NEED FOR VACCINATION: ICD-10-CM

## 2024-12-09 PROCEDURE — 3080F DIAST BP >= 90 MM HG: CPT | Performed by: STUDENT IN AN ORGANIZED HEALTH CARE EDUCATION/TRAINING PROGRAM

## 2024-12-09 PROCEDURE — 3077F SYST BP >= 140 MM HG: CPT | Performed by: STUDENT IN AN ORGANIZED HEALTH CARE EDUCATION/TRAINING PROGRAM

## 2024-12-09 PROCEDURE — 90656 IIV3 VACC NO PRSV 0.5 ML IM: CPT | Performed by: STUDENT IN AN ORGANIZED HEALTH CARE EDUCATION/TRAINING PROGRAM

## 2024-12-09 PROCEDURE — 99396 PREV VISIT EST AGE 40-64: CPT | Mod: 25 | Performed by: STUDENT IN AN ORGANIZED HEALTH CARE EDUCATION/TRAINING PROGRAM

## 2024-12-09 PROCEDURE — 90471 IMMUNIZATION ADMIN: CPT | Performed by: STUDENT IN AN ORGANIZED HEALTH CARE EDUCATION/TRAINING PROGRAM

## 2024-12-09 ASSESSMENT — PATIENT HEALTH QUESTIONNAIRE - PHQ9: CLINICAL INTERPRETATION OF PHQ2 SCORE: 0

## 2024-12-09 NOTE — PROGRESS NOTES
Subjective:     CC:   Chief Complaint   Patient presents with    Annual Exam     Annual check up       HPI:   Rosita Chu is a 59 y.o. female who presents for annual exam. She is feeling well and denies any complaints.    Ob-Gyn/ History:    Patient has GYN provider: No  /Para:    Last Pap Smear:  Completed in 2020 with negative cytology and HPV. No history of abnormal pap smears.  Gyn Surgery:  Uterine ablation several years ago due to heavy bleeding  Current Contraceptive Method:  None. Yes currently sexually active.  Last menstrual period:  Postmenopausal.    No significant bloating/fluid retention, pelvic pain, or dyspareunia. No vaginal discharge  Post-menopausal bleeding: None  Urinary incontinence: None  Folate intake: N/A    Health Maintenance  Advanced directive: N/A  Osteoporosis Screen/ DEXA: N/A  PT for falls prevention: N/A  Cholesterol Screening: Completed in 10/2023 with elevate total cholesterol and LDL. Patient established with cardiology  Diabetes Screening: Completed in 10/2023 with normal fasting blood glucose level  Aspirin Use: N/A    Anticipatory Guidance  Diet: Patient continues to try a low sodium diet.   Exercise: She has been doing a little bit of walking  Substance Abuse: N/A  Safe in relationship.   Seat belts, bike helmet, gun safety discussed.  Sun protection used.  Dental Home.    Cancer screening  Colorectal Cancer Screening: Completed in 2022 with recommendation of repeating in 10 years  Lung Cancer Screening: N/A  Cervical Cancer Screening: Up-to-date, will repeat in 2025  Breast Cancer Screening: Completed in 2024      Infectious disease screening/Immunizations  --STI Screening: Declined   --Practices safe sex.  --HIV Screening: Declined  --Hepatitis C Screening: Declined  --Immunizations:    Influenza: Up-to-date   HPV:  N/A   Tetanus: Up to date   Shingles: Recommended to receive at the pharmacy   Pneumococcal : N/A   Hepatitis B:  Completed  COVID-19: Patient to receive at pharmacy  Other immunizations: N/A    She  has a past medical history of Abdominal pain (9/19/2022), Acne (11/22/2017), Allergic rhinitis (10/06/2014), Anxiety, ASTHMA, ASTHMA, ASTHMA, Depression, Hypertension, Lichen sclerosus et atrophicus of the vulva, Pain, Palpitations, Palpitations (1/3/2022), Postoperative pain (12/15/2022), SVT (supraventricular tachycardia) (HCC) (5/9/2022), Toenail fungus, and Toenail fungus (12/7/2020).    She has no past medical history of Acute nasopharyngitis, Anesthesia, Anginal syndrome (HCC), Arrhythmia, Arthritis, Blood clotting disorder (HCC), Bowel habit changes, Breath shortness, Bronchitis, Cancer (HCC), Carcinoma in situ of respiratory system, Cataract, Congestive heart failure (HCC), Continuous ambulatory peritoneal dialysis status (HCC), Coughing blood, Dental disorder, Diabetes (HCC), Dialysis patient (HCC), Disorder of thyroid, Emphysema of lung (HCC), Glaucoma, Heart burn, Heart murmur, Heart valve disease, Hepatitis A, Hepatitis B, Hepatitis C, Hiatus hernia syndrome, High cholesterol, Indigestion, Jaundice, Myocardial infarct (HCC), Pacemaker, Pneumonia, Pregnant, Renal disorder, Rheumatic fever, Seizure (HCC), Sleep apnea, Snoring, Stroke (HCC), Tuberculosis, Urinary bladder disorder, or Urinary incontinence.  She  has a past surgical history that includes tonsillectomy; lumbar laminectomy diskectomy (2005); hysteroscopy thermal ablation; and gonzalo by laparoscopy (N/A, 12/15/2022).    Family History   Problem Relation Age of Onset    Hypertension Mother     Alcohol/Drug Sister     Alcohol/Drug Sister     Alcohol/Drug Brother     Psychiatric Illness Son         Schizoaffective Disorder    Heart Disease Neg Hx        Social History     Socioeconomic History    Marital status:      Spouse name: Not on file    Number of children: Not on file    Years of education: Not on file    Highest education level: Not on file    Occupational History    Not on file   Tobacco Use    Smoking status: Never    Smokeless tobacco: Never   Vaping Use    Vaping status: Never Used   Substance and Sexual Activity    Alcohol use: Not Currently     Alcohol/week: 1.2 oz     Types: 2 Glasses of wine per week     Comment: rarely    Drug use: No    Sexual activity: Not Currently     Partners: 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: , 3 children, Manicurist   Other Topics Concern    Not on file   Social History Narrative    Not on file     Social Drivers of Health     Financial Resource Strain: Not on file   Food Insecurity: Not on file   Transportation Needs: Not on file   Physical Activity: Not on file   Stress: Not on file   Social Connections: Not on file   Intimate Partner Violence: Not on file   Housing Stability: Not on file       Patient Active Problem List    Diagnosis Date Noted    Dyslipidemia 05/16/2023    Primary hypertension 01/03/2022    AK (actinic keratosis) 12/07/2020    Obesity (BMI 30.0-34.9) 11/18/2016    Abnormal mammogram of both breasts 11/18/2016    Allergic rhinitis 10/06/2014    Lichen sclerosus et atrophicus of the vulva 01/22/2012    Anxiety     Depression          Current Outpatient Medications   Medication Sig Dispense Refill    losartan (COZAAR) 50 MG Tab TAKE 1 TABLET BY MOUTH EVERY EVENING 90 Tablet 0    clobetasol (TEMOVATE) 0.05 % Ointment Apply 1 Application  topically 2 times a day. 60 g 2    fexofenadine (ALLEGRA) 60 MG Tab Take 60 mg by mouth every day.       No current facility-administered medications for this visit.     Allergies   Allergen Reactions    Ees [Erythromycin] Hives    Pcn [Penicillins] Itching and Swelling     Throat swelling       Review of Systems  Constitutional: Negative for fever, chills and malaise/fatigue.   HENT: Negative for congestion.    Eyes: Negative for pain.    Respiratory: Negative for cough and shortness of breath.  Cardiovascular: Negative for leg swelling.   Gastrointestinal: Negative for nausea,  "vomiting, abdominal pain and diarrhea. Has constipation.  Genitourinary: Negative for dysuria and hematuria.   Skin: Negative for rash.   Neurological: Negative for dizziness, focal weakness and headaches.   Endo/Heme/Allergies: Does not bleed easily.   Psychiatric/Behavioral: Negative for depression.  The patient is not nervous/anxious.      Objective:     BP (!) 148/90 (BP Location: Right arm, Patient Position: Sitting, BP Cuff Size: Large adult)   Pulse 74   Temp 37 °C (98.6 °F) (Temporal)   Ht 1.702 m (5' 7\")   Wt 99 kg (218 lb 3.2 oz)   SpO2 99%   BMI 34.17 kg/m²   Body mass index is 34.17 kg/m².  Wt Readings from Last 4 Encounters:   12/09/24 99 kg (218 lb 3.2 oz)   12/04/23 93.9 kg (207 lb)   10/24/23 96.6 kg (213 lb)   10/16/23 95.3 kg (210 lb)       Physical Exam:  Constitutional: Well-developed and well-nourished. Not diaphoretic. No distress.   Skin: Skin is warm and dry. No rash noted.  Head: Atraumatic without lesions.  Eyes: Conjunctivae and extraocular motions are normal. Pupils are equal, round, and reactive to light. No scleral icterus.   Ears:  External ears unremarkable. Tympanic membranes clear and intact.  Nose: Nares patent. Septum midline. Turbinates without erythema nor edema. No discharge.   Mouth/Throat: Dentition is good. Tongue normal. Oropharynx is clear and moist. Posterior pharynx without erythema or exudates.  Neck: Supple, trachea midline. Normal range of motion. No thyromegaly present. No lymphadenopathy--cervical or supraclavicular.  Cardiovascular: Regular rate and rhythm, S1 and S2 without murmur, rubs, or gallops.  Lungs: Normal inspiratory effort, CTA bilaterally, no wheezes/rhonchi/rales  Breast: Deferred  Abdomen: Soft, non tender, and without distention. Active bowel sounds in all four quadrants. No rebound, guarding, masses or HSM.  : Deferred  Extremities: No cyanosis, clubbing, erythema, nor edema. Distal pulses intact and symmetric.   Musculoskeletal: All major " joints AROM full in all directions without pain.  Neurological: Alert and oriented x 3. DTRs 2+/3 and symmetric. No cranial nerve deficit. 5/5 myotomes. Sensation intact.   Psychiatric:  Behavior, mood, and affect are appropriate.        Assessment and Plan:     1. Wellness examination  Patient presents today for annual preventative wellness visit.  She is up-to-date on annual preventative screenings.  Annual lab orders provided today.  Patient received influenza vaccine.  COVID booster and shingles vaccine commended to be completed at the pharmacy.  - HEMOGLOBIN A1C; Future  - Comp Metabolic Panel; Future  - Lipid Profile; Future  - CBC WITH DIFFERENTIAL; Future    2. Need for vaccination  - INFLUENZA VACCINE TRI INJ (PF)          HCM:  Up-to-date   Labs per orders  Immunizations per orders  Patient counseled about skin care, diet, supplements, prenatal vitamins, safe sex and exercise.      Follow-up: Return in about 1 year (around 12/9/2025), or if symptoms worsen or fail to improve, for Annual.

## 2024-12-16 ENCOUNTER — OFFICE VISIT (OUTPATIENT)
Dept: CARDIOLOGY | Facility: MEDICAL CENTER | Age: 59
End: 2024-12-16
Attending: INTERNAL MEDICINE
Payer: COMMERCIAL

## 2024-12-16 VITALS
WEIGHT: 219 LBS | HEART RATE: 90 BPM | SYSTOLIC BLOOD PRESSURE: 122 MMHG | OXYGEN SATURATION: 94 % | HEIGHT: 67 IN | DIASTOLIC BLOOD PRESSURE: 70 MMHG | RESPIRATION RATE: 16 BRPM | BODY MASS INDEX: 34.37 KG/M2

## 2024-12-16 DIAGNOSIS — I10 PRIMARY HYPERTENSION: ICD-10-CM

## 2024-12-16 DIAGNOSIS — E78.5 DYSLIPIDEMIA: ICD-10-CM

## 2024-12-16 DIAGNOSIS — R93.1 AGATSTON CAC SCORE, <100: ICD-10-CM

## 2024-12-16 PROCEDURE — 3074F SYST BP LT 130 MM HG: CPT | Performed by: INTERNAL MEDICINE

## 2024-12-16 PROCEDURE — 3078F DIAST BP <80 MM HG: CPT | Performed by: INTERNAL MEDICINE

## 2024-12-16 PROCEDURE — 99214 OFFICE O/P EST MOD 30 MIN: CPT | Performed by: INTERNAL MEDICINE

## 2024-12-16 PROCEDURE — 99211 OFF/OP EST MAY X REQ PHY/QHP: CPT | Performed by: INTERNAL MEDICINE

## 2024-12-16 ASSESSMENT — ENCOUNTER SYMPTOMS
GASTROINTESTINAL NEGATIVE: 1
PSYCHIATRIC NEGATIVE: 1
BLURRED VISION: 0
DEPRESSION: 0
ABDOMINAL PAIN: 0
WEAKNESS: 0
COUGH: 0
MYALGIAS: 0
NERVOUS/ANXIOUS: 0
CARDIOVASCULAR NEGATIVE: 1
WEIGHT LOSS: 0
VOMITING: 0
CONSTITUTIONAL NEGATIVE: 1
EYES NEGATIVE: 1
DIZZINESS: 0
RESPIRATORY NEGATIVE: 1
CHILLS: 0
NEUROLOGICAL NEGATIVE: 1
DOUBLE VISION: 0
PALPITATIONS: 0
MUSCULOSKELETAL NEGATIVE: 1
SHORTNESS OF BREATH: 0
BRUISES/BLEEDS EASILY: 0
HEADACHES: 0
CLAUDICATION: 0
FOCAL WEAKNESS: 0
FEVER: 0
NAUSEA: 0

## 2024-12-16 NOTE — PROGRESS NOTES
Chief Complaint   Patient presents with    Dyslipidemia    Hypertension     F/V Dx: Primary hypertension    Palpitations       Subjective     Rosita Chu is a 59 y.o. female who presents today for annual follow up of positive CT coronary calcium study, hypertension and hyperlipidemia.    Since the patient's last visit on 10/16/23, she has been doing well clinically from cardiac standpoint. She denies fatigue, chest pain, shortness of breath, palpitations, lower extremity edema, dizziness or syncope. She underwent CT coronary calcium study which was positive as described. She keeps active walking 2 times per week.     Past Medical History:   Diagnosis Date    Abdominal pain 9/19/2022    Acne 11/22/2017    Allergic rhinitis 10/06/2014    Anxiety     ASTHMA     S/P working in a [employer]    ASTHMA     ASTHMA     History of asthma, now resolved    Depression     Hypertension     Lichen sclerosus et atrophicus of the vulva     Pain     Upper abdomen secondary to gallbladder symptoms    Palpitations     Palpitations 1/3/2022    Postoperative pain 12/15/2022    SVT (supraventricular tachycardia) (HCC) 5/9/2022    Toenail fungus     Toenail fungus 12/7/2020     Past Surgical History:   Procedure Laterality Date    ALLIE BY LAPAROSCOPY N/A 12/15/2022    Procedure: LAPAROSCOPIC CHOLECYSTECTOMY.;  Surgeon: Clarence Olvera M.D.;  Location: SURGERY [facility];  Service: General    LUMBAR LAMINECTOMY DISKECTOMY  2005    L4-5    HYSTEROSCOPY THERMAL ABLATION      TONSILLECTOMY      17 years old     Family History   Problem Relation Age of Onset    Hypertension Mother     Alcohol/Drug Sister     Alcohol/Drug Sister     Alcohol/Drug Brother     Psychiatric Illness Son         Schizoaffective Disorder    Heart Disease Neg Hx      Social History     Socioeconomic History    Marital status:      Spouse name: Not on file    Number of children: Not on file    Years of education: Not on file    Highest education level: Not  on file   Occupational History    Not on file   Tobacco Use    Smoking status: Never    Smokeless tobacco: Never   Vaping Use    Vaping status: Never Used   Substance and Sexual Activity    Alcohol use: Not Currently    Drug use: No    Sexual activity: Not Currently     Partners: 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: , 3 children, Manicurist   Other Topics Concern    Not on file   Social History Narrative    Not on file     Social Drivers of Health     Financial Resource Strain: Not on file   Food Insecurity: Not on file   Transportation Needs: Not on file   Physical Activity: Not on file   Stress: Not on file   Social Connections: Not on file   Intimate Partner Violence: Not on file   Housing Stability: Not on file     Allergies   Allergen Reactions    Ees [Erythromycin] Hives    Pcn [Penicillins] Itching and Swelling     Throat swelling     (Medications reviewed.)  Outpatient Encounter Medications as of 12/16/2024   Medication Sig Dispense Refill    losartan (COZAAR) 50 MG Tab TAKE 1 TABLET BY MOUTH EVERY EVENING 90 Tablet 0    clobetasol (TEMOVATE) 0.05 % Ointment Apply 1 Application  topically 2 times a day. 60 g 2    fexofenadine (ALLEGRA) 60 MG Tab Take 60 mg by mouth every day.       No facility-administered encounter medications on file as of 12/16/2024.     Review of Systems   Constitutional: Negative.  Negative for chills, fever, malaise/fatigue and weight loss.   HENT: Negative.  Negative for hearing loss.    Eyes: Negative.  Negative for blurred vision and double vision.   Respiratory: Negative.  Negative for cough and shortness of breath.    Cardiovascular: Negative.  Negative for chest pain, palpitations, claudication and leg swelling.   Gastrointestinal: Negative.  Negative for abdominal pain, nausea and vomiting.   Genitourinary: Negative.  Negative for dysuria and urgency.   Musculoskeletal: Negative.  Negative for joint pain and myalgias.   Skin: Negative.  Negative for itching and rash.   Neurological:  "Negative.  Negative for dizziness, focal weakness, weakness and headaches.   Endo/Heme/Allergies: Negative.  Does not bruise/bleed easily.   Psychiatric/Behavioral: Negative.  Negative for depression. The patient is not nervous/anxious.               Objective     /70 (BP Location: Left arm, Patient Position: Sitting, BP Cuff Size: Adult)   Pulse 90   Resp 16   Ht 1.702 m (5' 7.01\")   Wt 99.3 kg (219 lb)   SpO2 94%   BMI 34.29 kg/m²     Physical Exam  Constitutional:       Appearance: Normal appearance. She is well-developed and normal weight.   HENT:      Head: Normocephalic and atraumatic.   Neck:      Vascular: No JVD.   Cardiovascular:      Rate and Rhythm: Normal rate and regular rhythm.      Heart sounds: Normal heart sounds.   Pulmonary:      Effort: Pulmonary effort is normal.      Breath sounds: Normal breath sounds.   Abdominal:      General: Bowel sounds are normal.      Palpations: Abdomen is soft.      Comments: No hepatosplenomegaly.   Musculoskeletal:         General: Normal range of motion.   Lymphadenopathy:      Cervical: No cervical adenopathy.   Skin:     General: Skin is warm and dry.   Neurological:      Mental Status: She is alert and oriented to person, place, and time.            CARDIAC STUDIES/PROCEDURES:    CT CARDIAC SCORING (10/23/23)  Coronary calcification:  LMA - 0.0  LCX - 0.0  LAD - 7.1  RCA - 4.3  Total Calcium Score: 11.4  Percentile: Calcium score is above the 50th percentile for the patient's age and sex.  IMPRESSION:  1.  Calcium score of 11.4.  (study result reviewed)      ECHOCARDIOGRAM CONCLUSIONS (04/20/22)  No prior study is available for comparison.   Normal left ventricular systolic function.  The left ventricular ejection fraction is visually estimated to be 60%.  No significant valvular disease.      EKG performed on (02/08/22) EKG shows sinus rhythm with incomplete left bundle branch block.     Laboratory results of (10/03/23) were reviewed. Cholesterol " profile of 253/139/51/174mg/dL noted.   Laboratory results of (06/29/22) Cholesterol profile of 260/213/49/168 mg/dL noted.      MYOCARDIAL PERFUSION STUDY CONCLUSIONS (02/10/22)  NUCLEAR IMAGING INTERPRETATION  Normal Lexiscan myocardial perfusion study.   No evidence of ischemia or infarct.   SDS 0.  LVEF 68%.  Rest EKG Sinus rhythm with incomplete left bundle branch block.  No ischemic changes with Regadenoson.  No arrhythmias.  No chest pain.  ECG INTERPRETATION  Negative stress ECG for ischemia.     ZIO PATCH REPORT (02/12/22-02/26/22)   Zio Patch study showing predominately sinus rhythm with maximum rate of 146 bpm, minimum rate of 53 bpm, average of 79 bpm.   Atrial fibrillation: None.   Supraventricular tachycardia: 5 episodes of supraventricular tachycardia with fastest interval lasting 6 beats with a max rate of 146 bpm with longest lasting 20 seconds with average rate of 115 bpm noted.   Pauses: None.   Heart block: None.   Ventricular tachycardia: None.   <1% burden of premature ventricular contractions and <1% burden of premature atrial contractions.     Assessment & Plan     1. Agatston CAC score, <100        2. Primary hypertension        3. Dyslipidemia            Medical Decision Making: Today's Assessment/Status/Plan:        Positive CT coronary calcium study: She is clinically doing well from coronary standpoint.  Hypertension: Currently, the average blood pressure is well controlled. We will continue with losartan .  Hyperlipidemia: Most recent labs demonstrates high cholesterol levels. She is working on diet modification and exercise. She has labs pending from her primary care physician.  Medication intolerance: She is intolerant to higher dose of lisinopril due to cough.  Additional information: She lost her  to suicide. Her son suffers with drug dependence and schizoaffective disorder. She is a salon owner.    We will follow up in 6 months.    CC Santos Catherine

## 2024-12-31 DIAGNOSIS — R00.2 PALPITATIONS: ICD-10-CM

## 2024-12-31 RX ORDER — LOSARTAN POTASSIUM 50 MG/1
50 TABLET ORAL EVERY EVENING
Qty: 90 TABLET | Refills: 3 | Status: SHIPPED | OUTPATIENT
Start: 2024-12-31

## 2024-12-31 NOTE — TELEPHONE ENCOUNTER
Is the patient due for a refill? Yes    Was the patient seen the past year? Yes    Date of last office visit: 12.16.2024    Does the patient have an upcoming appointment?  Yes   If yes, When? 06.23.2025    Provider to refill: DARÍO    Does the patient have [facility]PC Plus and need 100-day supply? (This applies to ALL medications) Patient does not have SCP

## 2025-04-11 PROCEDURE — 90471 IMMUNIZATION ADMIN: CPT | Performed by: STUDENT IN AN ORGANIZED HEALTH CARE EDUCATION/TRAINING PROGRAM

## 2025-04-11 PROCEDURE — 90746 HEPB VACCINE 3 DOSE ADULT IM: CPT | Mod: JZ | Performed by: STUDENT IN AN ORGANIZED HEALTH CARE EDUCATION/TRAINING PROGRAM

## 2025-04-18 DIAGNOSIS — N90.4 LICHEN SCLEROSUS ET ATROPHICUS OF THE VULVA: ICD-10-CM

## 2025-04-21 RX ORDER — CLOBETASOL PROPIONATE 0.5 MG/G
1 OINTMENT TOPICAL 2 TIMES DAILY
Qty: 60 G | Refills: 0 | Status: SHIPPED | OUTPATIENT
Start: 2025-04-21

## 2025-04-21 NOTE — TELEPHONE ENCOUNTER
Received request via: Pharmacy    Was the patient seen in the last year in this department? Yes    Does the patient have an active prescription (recently filled or refills available) for medication(s) requested? No    [facility]    Does the patient have [facility] Plus and need 100-day supply? (This applies to ALL medications) Patient does not have SCP

## 2025-06-23 ENCOUNTER — APPOINTMENT (OUTPATIENT)
Dept: CARDIOLOGY | Facility: MEDICAL CENTER | Age: 60
End: 2025-06-23
Attending: INTERNAL MEDICINE
Payer: COMMERCIAL

## (undated) DEVICE — SET LEADWIRE 5 LEAD BEDSIDE DISPOSABLE ECG (1SET OF 5/EA)

## (undated) DEVICE — GLOVE BIOGEL INDICATOR SZ 8 SURGICAL PF LTX - (50/BX 4BX/CA)

## (undated) DEVICE — CATHETER CHOLANGIOGRAM TAUT - (10/BX)

## (undated) DEVICE — PACK LAP CHOLE OR - (2EA/CA)

## (undated) DEVICE — ELECTRODE DUAL RETURN W/ CORD - (50/PK)

## (undated) DEVICE — SYSTEM CLEARIFY VISUALIZATION (10EA/PK)

## (undated) DEVICE — SUTURE 4-0 MONOCRYL PLUS PS-2 - 27 INCH (36/BX)

## (undated) DEVICE — COVER LIGHT HANDLE ALC PLUS DISP (18EA/BX)

## (undated) DEVICE — LACTATED RINGERS INJ 1000 ML - (14EA/CA 60CA/PF)

## (undated) DEVICE — HEMOSTAT ABSORBABLE POWDER SURGICEL 3G (5EA/BX)

## (undated) DEVICE — SENSOR OXIMETER ADULT SPO2 RD SET (20EA/BX)

## (undated) DEVICE — APPLICATOR ENDOSCOPIC SURGICEL (5EA/BX)

## (undated) DEVICE — GLOVE SIZE 7.0 SURGEON ACCELERATOR FREE GREEN (50PR/BX 4BX/CA)

## (undated) DEVICE — SET TUBING PNEUMOCLEAR HIGH FLOW SMOKE EVACUATION (10EA/BX)

## (undated) DEVICE — GLOVE BIOGEL SZ 8 SURGICAL PF LTX - (50PR/BX 4BX/CA)

## (undated) DEVICE — TROCAR 5X100 NON BLADED Z-TH - READ KII (6/BX)

## (undated) DEVICE — GOWN SURGEONS X-LARGE - DISP. (30/CA)

## (undated) DEVICE — SLEEVE, VASO, THIGH, MED

## (undated) DEVICE — SUTURE 0 VICRYL PLUS UR-6 - 27 INCH (36/BX)

## (undated) DEVICE — GLOVE SZ 7.5 BIOGEL PI MICRO - PF LF (50PR/BX)

## (undated) DEVICE — SUCTION INSTRUMENT YANKAUER BULBOUS TIP W/O VENT (50EA/CA)

## (undated) DEVICE — DERMABOND ADVANCED - (12EA/BX)

## (undated) DEVICE — GLOVE BIOGEL PI INDICATOR SZ 8.0 SURGICAL PF LF -(50/BX 4BX/CA)

## (undated) DEVICE — CANISTER SUCTION 3000ML MECHANICAL FILTER AUTO SHUTOFF MEDI-VAC NONSTERILE LF DISP  (40EA/CA)

## (undated) DEVICE — BLADE SURGICAL #15 - (50/BX 3BX/CA)

## (undated) DEVICE — TUBING CLEARLINK DUO-VENT - C-FLO (48EA/CA)

## (undated) DEVICE — CHLORAPREP 26 ML APPLICATOR - ORANGE TINT(25/CA)

## (undated) DEVICE — GOWN WARMING STANDARD FLEX - (30/CA)

## (undated) DEVICE — TROCAR LAPSCP 100MM 12MM NTHRD - (6/BX)

## (undated) DEVICE — GLOVE BIOGEL SZ 8.5 SURGICAL PF LTX - (50PR/BX 4BX/CA)

## (undated) DEVICE — SUTURE GENERAL

## (undated) DEVICE — SODIUM CHL IRRIGATION 0.9% 1000ML (12EA/CA)

## (undated) DEVICE — SCISSORS 5MM CVD (6EA/BX)

## (undated) DEVICE — CANNULA W/SEAL 5X100 Z-THRE - ADED KII (12/BX)

## (undated) DEVICE — SET EXTENSION WITH 2 PORTS (48EA/CA) ***PART #2C8610 IS A SUBSTITUTE*****